# Patient Record
Sex: FEMALE | Race: WHITE | HISPANIC OR LATINO | Employment: PART TIME | URBAN - METROPOLITAN AREA
[De-identification: names, ages, dates, MRNs, and addresses within clinical notes are randomized per-mention and may not be internally consistent; named-entity substitution may affect disease eponyms.]

---

## 2017-09-13 ENCOUNTER — HOSPITAL ENCOUNTER (OUTPATIENT)
Dept: LAB | Facility: MEDICAL CENTER | Age: 37
End: 2017-09-13
Attending: NURSE PRACTITIONER
Payer: MEDICAID

## 2017-09-13 ENCOUNTER — HOSPITAL ENCOUNTER (OUTPATIENT)
Facility: MEDICAL CENTER | Age: 37
End: 2017-09-13
Attending: NURSE PRACTITIONER
Payer: MEDICAID

## 2017-09-13 ENCOUNTER — INITIAL PRENATAL (OUTPATIENT)
Dept: OBGYN | Facility: CLINIC | Age: 37
End: 2017-09-13
Payer: MEDICAID

## 2017-09-13 VITALS
WEIGHT: 205 LBS | SYSTOLIC BLOOD PRESSURE: 112 MMHG | BODY MASS INDEX: 35 KG/M2 | DIASTOLIC BLOOD PRESSURE: 70 MMHG | HEIGHT: 64 IN

## 2017-09-13 DIAGNOSIS — O09.512 AMA (ADVANCED MATERNAL AGE) PRIMIGRAVIDA 35+, SECOND TRIMESTER: ICD-10-CM

## 2017-09-13 DIAGNOSIS — Z34.02 ENCOUNTER FOR SUPERVISION OF NORMAL FIRST PREGNANCY IN SECOND TRIMESTER: ICD-10-CM

## 2017-09-13 PROBLEM — O09.519 AMA (ADVANCED MATERNAL AGE) PRIMIGRAVIDA 35+: Status: ACTIVE | Noted: 2017-09-13

## 2017-09-13 LAB
ABO GROUP BLD: NORMAL
APPEARANCE UR: NORMAL
BILIRUB UR STRIP-MCNC: NORMAL MG/DL
BLD GP AB SCN SERPL QL: NORMAL
COLOR UR AUTO: NORMAL
GLUCOSE UR STRIP.AUTO-MCNC: NORMAL MG/DL
KETONES UR STRIP.AUTO-MCNC: NORMAL MG/DL
LEUKOCYTE ESTERASE UR QL STRIP.AUTO: NORMAL
NITRITE UR QL STRIP.AUTO: NORMAL
PH UR STRIP.AUTO: 7.5 [PH] (ref 5–8)
PROT UR QL STRIP: NORMAL MG/DL
RBC UR QL AUTO: NORMAL
RH BLD: NORMAL
SP GR UR STRIP.AUTO: 1.01
UROBILINOGEN UR STRIP-MCNC: NORMAL MG/DL

## 2017-09-13 PROCEDURE — 59402 PR NEW OB HIGH RISK: CPT | Performed by: NURSE PRACTITIONER

## 2017-09-13 PROCEDURE — 86762 RUBELLA ANTIBODY: CPT

## 2017-09-13 PROCEDURE — 81001 URINALYSIS AUTO W/SCOPE: CPT

## 2017-09-13 PROCEDURE — 87491 CHLMYD TRACH DNA AMP PROBE: CPT

## 2017-09-13 PROCEDURE — 87340 HEPATITIS B SURFACE AG IA: CPT

## 2017-09-13 PROCEDURE — 88175 CYTOPATH C/V AUTO FLUID REDO: CPT

## 2017-09-13 PROCEDURE — 86900 BLOOD TYPING SEROLOGIC ABO: CPT

## 2017-09-13 PROCEDURE — 87591 N.GONORRHOEAE DNA AMP PROB: CPT

## 2017-09-13 PROCEDURE — 36415 COLL VENOUS BLD VENIPUNCTURE: CPT

## 2017-09-13 PROCEDURE — 81002 URINALYSIS NONAUTO W/O SCOPE: CPT | Performed by: NURSE PRACTITIONER

## 2017-09-13 PROCEDURE — 86901 BLOOD TYPING SEROLOGIC RH(D): CPT

## 2017-09-13 PROCEDURE — 86850 RBC ANTIBODY SCREEN: CPT

## 2017-09-13 PROCEDURE — 86780 TREPONEMA PALLIDUM: CPT

## 2017-09-13 PROCEDURE — 81511 FTL CGEN ABNOR FOUR ANAL: CPT

## 2017-09-13 PROCEDURE — 87389 HIV-1 AG W/HIV-1&-2 AB AG IA: CPT

## 2017-09-13 PROCEDURE — 85025 COMPLETE CBC W/AUTO DIFF WBC: CPT

## 2017-09-13 NOTE — PATIENT INSTRUCTIONS
Second Trimester of Pregnancy  The second trimester is from week 13 through week 28, month 4 through 6. This is often the time in pregnancy that you feel your best. Often times, morning sickness has lessened or quit. You may have more energy, and you may get hungry more often. Your unborn baby (fetus) is growing rapidly. At the end of the sixth month, he or she is about 9 inches long and weighs about 1½ pounds. You will likely feel the baby move (quickening) between 18 and 20 weeks of pregnancy.  HOME CARE   · Avoid all smoking, herbs, and alcohol. Avoid drugs not approved by your doctor.  · Do not use any tobacco products, including cigarettes, chewing tobacco, and electronic cigarettes. If you need help quitting, ask your doctor. You may get counseling or other support to help you quit.  · Only take medicine as told by your doctor. Some medicines are safe and some are not during pregnancy.  · Exercise only as told by your doctor. Stop exercising if you start having cramps.  · Eat regular, healthy meals.  · Wear a good support bra if your breasts are tender.  · Do not use hot tubs, steam rooms, or saunas.  · Wear your seat belt when driving.  · Avoid raw meat, uncooked cheese, and liter boxes and soil used by cats.  · Take your prenatal vitamins.  · Take 6679-7501 milligrams of calcium daily starting at the 20th week of pregnancy until you deliver your baby.  · Try taking medicine that helps you poop (stool softener) as needed, and if your doctor approves. Eat more fiber by eating fresh fruit, vegetables, and whole grains. Drink enough fluids to keep your pee (urine) clear or pale yellow.  · Take warm water baths (sitz baths) to soothe pain or discomfort caused by hemorrhoids. Use hemorrhoid cream if your doctor approves.  · If you have puffy, bulging veins (varicose veins), wear support hose. Raise (elevate) your feet for 15 minutes, 3-4 times a day. Limit salt in your diet.  · Avoid heavy lifting, wear low heals,  and sit up straight.  · Rest with your legs raised if you have leg cramps or low back pain.  · Visit your dentist if you have not gone during your pregnancy. Use a soft toothbrush to brush your teeth. Be gentle when you floss.  · You can have sex (intercourse) unless your doctor tells you not to.  · Go to your doctor visits.  GET HELP IF:   · You feel dizzy.  · You have mild cramps or pressure in your lower belly (abdomen).  · You have a nagging pain in your belly area.  · You continue to feel sick to your stomach (nauseous), throw up (vomit), or have watery poop (diarrhea).  · You have bad smelling fluid coming from your vagina.  · You have pain with peeing (urination).  GET HELP RIGHT AWAY IF:   · You have a fever.  · You are leaking fluid from your vagina.  · You have spotting or bleeding from your vagina.  · You have severe belly cramping or pain.  · You lose or gain weight rapidly.  · You have trouble catching your breath and have chest pain.  · You notice sudden or extreme puffiness (swelling) of your face, hands, ankles, feet, or legs.  · You have not felt the baby move in over an hour.  · You have severe headaches that do not go away with medicine.  · You have vision changes.     This information is not intended to replace advice given to you by your health care provider. Make sure you discuss any questions you have with your health care provider.     Document Released: 03/14/2011 Document Revised: 01/08/2016 Document Reviewed: 02/18/2014  [employer] Interactive Patient Education ©2016 [employer]

## 2017-09-13 NOTE — LETTER
September 13, 2017            Yousuf Rae is pregnant at 17w 2d with an estimated delivery date of 02/19/18 at this time.        Thank you,          LAUREL Oliva.

## 2017-09-13 NOTE — PROGRESS NOTES
Pt. Here for NOB visit today.  # [phone]  First prenatal care  Pt. States having white discharge w/ odor and itching during intercourse.   Pharmacy verified  Pt would like AFP.

## 2017-09-13 NOTE — PROGRESS NOTES
"Cc: New OB visit    HPI:  The patient is a 36 y.o.  17w2d based upon LMP  Patient's last menstrual period was 05/15/2017 (exact date). or @ultrasound@  She presents for her new obstetric visit.  She Reports  fetal movement,  denies  vaginal bleeding,  denies  leakage of fluid,  denies contractions.   She Denies nausea/vomiting, denies headache, denies dysuria, Reports vaginal discharge.  Father of baby involved:yes, family support: yes    states gets UTI's and or vaginal infections with sex but not ever been tested     OB History    Para Term  AB Living   1             SAB TAB Ectopic Molar Multiple Live Births                    # Outcome Date GA Lbr Tato/2nd Weight Sex Delivery Anes PTL Lv   1 Current                 No past medical history on file.  No past surgical history on file.  Social History     Social History   • Marital status:      Spouse name: N/A   • Number of children: N/A   • Years of education: N/A     Occupational History   • Not on file.     Social History Main Topics   • Smoking status: Never Smoker   • Smokeless tobacco: Never Used   • Alcohol use No   • Drug use: No   • Sexual activity: Yes      Comment: none     Other Topics Concern   • Not on file     Social History Narrative   • No narrative on file     History reviewed. No pertinent family history.  Allergies:   Allergies as of 2017   • (No Known Allergies)       PE:    Blood pressure 112/70, height 1.626 m (5' 4\"), weight 93 kg (205 lb), last menstrual period 05/15/2017.    SVE: closed thick floating      see prenatal physical  Wet prep done here prepared and read by me negative   LABS: Pending: PNP, Urine culture, GC/CT, Pap, AFP     A/P:  36 y.o.  17w2d based upon  Patient's last menstrual period was 05/15/2017 (exact date)..  She is here for her new obstetric visit.    1. Encounter for supervision of normal first pregnancy in second trimester  POCT Urinalysis    PREG CNTR PRENATAL PN    AFP TETRA    " THINPREP RFLX HPV ASCUS W/CTNG    Prenatal MV-Min-Fe Fum-FA-DHA (PRENATAL 1 PO)    REFERRAL TO PERINATOLOGY    US-OB 2ND 3RD TRI COMPLETE   2. AMA (advanced maternal age) primigravida 35+, second trimester  POCT Urinalysis    PREG CNTR PRENATAL PN    AFP TETRA    THINPREP RFLX HPV ASCUS W/CTNG    Prenatal MV-Min-Fe Fum-FA-DHA (PRENATAL 1 PO)    REFERRAL TO PERINATOLOGY    US-OB 2ND 3RD TRI COMPLETE       1. Ultrasound for dates and anatomy to be scheduled in 3 weeks.  2.  2nd trimester screening for Down Syndrome and neural tube defects offered. Patient accepts  3.  Do prenatal labs.  4.  NOB packet given with informational handouts.  5.  Increase water intake and encouraged healthy nutrition.  6.  Discussion of weight gain and proper exercise during pregnancy.  7.  continue prenatal vitamins.  8.  Follow-up in 4 weeks.   9. Discussion  individual care.   10. Genetic counseling at [facility] referral accepted and placed

## 2017-09-14 LAB
APPEARANCE UR: ABNORMAL
BACTERIA #/AREA URNS HPF: NEGATIVE /HPF
BASOPHILS # BLD AUTO: 0.3 % (ref 0–1.8)
BASOPHILS # BLD: 0.02 K/UL (ref 0–0.12)
BILIRUB UR QL STRIP.AUTO: NEGATIVE
C TRACH DNA GENITAL QL NAA+PROBE: NEGATIVE
CAOX CRY #/AREA URNS HPF: NORMAL /HPF
COLOR UR: YELLOW
CULTURE IF INDICATED INDCX: NO UA CULTURE
CYTOLOGY REG CYTOL: NORMAL
EOSINOPHIL # BLD AUTO: 0.04 K/UL (ref 0–0.51)
EOSINOPHIL NFR BLD: 0.5 % (ref 0–6.9)
EPI CELLS #/AREA URNS HPF: NORMAL /HPF
ERYTHROCYTE [DISTWIDTH] IN BLOOD BY AUTOMATED COUNT: 45.9 FL (ref 35.9–50)
GLUCOSE UR STRIP.AUTO-MCNC: NEGATIVE MG/DL
HBV SURFACE AG SER QL: NEGATIVE
HCT VFR BLD AUTO: 39.4 % (ref 37–47)
HGB BLD-MCNC: 13.7 G/DL (ref 12–16)
HIV 1+2 AB+HIV1 P24 AG SERPL QL IA: NON REACTIVE
HYALINE CASTS #/AREA URNS LPF: NORMAL /LPF
IMM GRANULOCYTES # BLD AUTO: 0.03 K/UL (ref 0–0.11)
IMM GRANULOCYTES NFR BLD AUTO: 0.4 % (ref 0–0.9)
KETONES UR STRIP.AUTO-MCNC: NEGATIVE MG/DL
LEUKOCYTE ESTERASE UR QL STRIP.AUTO: NEGATIVE
LYMPHOCYTES # BLD AUTO: 1.06 K/UL (ref 1–4.8)
LYMPHOCYTES NFR BLD: 14 % (ref 22–41)
MCH RBC QN AUTO: 34.3 PG (ref 27–33)
MCHC RBC AUTO-ENTMCNC: 34.8 G/DL (ref 33.6–35)
MCV RBC AUTO: 98.7 FL (ref 81.4–97.8)
MICRO URNS: ABNORMAL
MONOCYTES # BLD AUTO: 0.53 K/UL (ref 0–0.85)
MONOCYTES NFR BLD AUTO: 7 % (ref 0–13.4)
N GONORRHOEA DNA GENITAL QL NAA+PROBE: NEGATIVE
NEUTROPHILS # BLD AUTO: 5.91 K/UL (ref 2–7.15)
NEUTROPHILS NFR BLD: 77.8 % (ref 44–72)
NITRITE UR QL STRIP.AUTO: NEGATIVE
NRBC # BLD AUTO: 0 K/UL
NRBC BLD AUTO-RTO: 0 /100 WBC
PH UR STRIP.AUTO: 6 [PH]
PLATELET # BLD AUTO: 230 K/UL (ref 164–446)
PMV BLD AUTO: 11 FL (ref 9–12.9)
PROT UR QL STRIP: NEGATIVE MG/DL
RBC # BLD AUTO: 3.99 M/UL (ref 4.2–5.4)
RBC # URNS HPF: NORMAL /HPF
RBC UR QL AUTO: NEGATIVE
RUBV AB SER QL: 184.7 IU/ML
SP GR UR STRIP.AUTO: 1.02
SPECIMEN SOURCE: NORMAL
TREPONEMA PALLIDUM IGG+IGM AB [PRESENCE] IN SERUM OR PLASMA BY IMMUNOASSAY: NON REACTIVE
UROBILINOGEN UR STRIP.AUTO-MCNC: 0.2 MG/DL
WBC # BLD AUTO: 7.6 K/UL (ref 4.8–10.8)
WBC #/AREA URNS HPF: NORMAL /HPF

## 2017-09-14 NOTE — PROGRESS NOTES
Referral faxed to [facility] on 9/14/17  They will contact patient to schedule appt.  Please check with patient if appt was made/ document. Thank you

## 2017-09-16 LAB
# FETUSES US: NORMAL
AFP MOM SERPL: 0.94
AFP SERPL-MCNC: 29 NG/ML
AGE - REPORTED: 36.3 YR
GA METHOD: NORMAL
GA: 17.29 WEEKS
HCG MOM SERPL: 0.88
HCG SERPL-ACNC: NORMAL IU/L
IDDM PATIENT QL: NO
INHIBIN A MOM SERPL: 1.65
INHIBIN A SERPL-MCNC: 231 PG/ML
INTEGRATED SCN PATIENT-IMP: NORMAL
PATHOLOGY STUDY: NORMAL
U ESTRIOL MOM SERPL: 1.31
U ESTRIOL SERPL-MCNC: 1.44 NG/ML

## 2017-10-05 ENCOUNTER — APPOINTMENT (OUTPATIENT)
Dept: RADIOLOGY | Facility: IMAGING CENTER | Age: 37
End: 2017-10-05
Attending: NURSE PRACTITIONER
Payer: MEDICAID

## 2017-10-05 DIAGNOSIS — O09.512 AMA (ADVANCED MATERNAL AGE) PRIMIGRAVIDA 35+, SECOND TRIMESTER: ICD-10-CM

## 2017-10-05 DIAGNOSIS — Z34.02 ENCOUNTER FOR SUPERVISION OF NORMAL FIRST PREGNANCY IN SECOND TRIMESTER: ICD-10-CM

## 2017-10-05 PROCEDURE — 76805 OB US >/= 14 WKS SNGL FETUS: CPT | Performed by: OBSTETRICS & GYNECOLOGY

## 2017-10-06 ENCOUNTER — DATING (OUTPATIENT)
Dept: OBGYN | Facility: CLINIC | Age: 37
End: 2017-10-06

## 2017-10-10 ENCOUNTER — ROUTINE PRENATAL (OUTPATIENT)
Dept: OBGYN | Facility: CLINIC | Age: 37
End: 2017-10-10
Payer: MEDICAID

## 2017-10-10 VITALS — SYSTOLIC BLOOD PRESSURE: 120 MMHG | BODY MASS INDEX: 36.22 KG/M2 | WEIGHT: 211 LBS | DIASTOLIC BLOOD PRESSURE: 70 MMHG

## 2017-10-10 DIAGNOSIS — R32 URINARY INCONTINENCE, UNSPECIFIED TYPE: ICD-10-CM

## 2017-10-10 LAB
APPEARANCE UR: NORMAL
BILIRUB UR STRIP-MCNC: NORMAL MG/DL
COLOR UR AUTO: NORMAL
GLUCOSE UR STRIP.AUTO-MCNC: NEGATIVE MG/DL
KETONES UR STRIP.AUTO-MCNC: NEGATIVE MG/DL
LEUKOCYTE ESTERASE UR QL STRIP.AUTO: NEGATIVE
NITRITE UR QL STRIP.AUTO: NEGATIVE
PH UR STRIP.AUTO: 8 [PH] (ref 5–8)
PROT UR QL STRIP: NORMAL MG/DL
RBC UR QL AUTO: NEGATIVE
SP GR UR STRIP.AUTO: 1.01
UROBILINOGEN UR STRIP-MCNC: NORMAL MG/DL

## 2017-10-10 PROCEDURE — 90040 PR PRENATAL FOLLOW UP: CPT | Performed by: NURSE PRACTITIONER

## 2017-10-10 PROCEDURE — 81002 URINALYSIS NONAUTO W/O SCOPE: CPT | Performed by: NURSE PRACTITIONER

## 2017-10-10 NOTE — PROGRESS NOTES
S: Yousuf Rae at [id number] for OB  follow up visit.  Patient  Has no complaints of today, feels like she is leaking urine when she has to go and with exercise  Fetal movement is +  Denies any loss of fluid, vaginal bleeding or contractions.    O: VSS  Urine dip normal no findings   See above values  Cervical exam NE     A: Primip 21w1d  AMA    P:   2nd   Trimester Guidance and comfort measures, diet and exercise review.  Labs:   RTC in 4 weeks   [facility] information given to call for apt for GC  Kegel exercises

## 2017-10-10 NOTE — PROGRESS NOTES
OB f/u. + fetal movement.  No VB, LOF or UC's.  Wt: 211lb      BP: 120/70  Good phone # [phone]  Preferred pharmacy confirmed.  Complete US done 10/5/17  [facility]. No appt yet. Information for [facility] given to patient.  C/o leaking urine when walking. Also c/o fingers getting numb and tingling  Flu vaccine declined.

## 2017-11-07 ENCOUNTER — ROUTINE PRENATAL (OUTPATIENT)
Dept: OBGYN | Facility: CLINIC | Age: 37
End: 2017-11-07
Payer: MEDICAID

## 2017-11-07 VITALS — WEIGHT: 217 LBS | SYSTOLIC BLOOD PRESSURE: 118 MMHG | BODY MASS INDEX: 37.25 KG/M2 | DIASTOLIC BLOOD PRESSURE: 70 MMHG

## 2017-11-07 DIAGNOSIS — O09.512 ELDERLY PRIMIGRAVIDA IN SECOND TRIMESTER: ICD-10-CM

## 2017-11-07 DIAGNOSIS — Z34.02 ENCOUNTER FOR SUPERVISION OF NORMAL FIRST PREGNANCY IN SECOND TRIMESTER: ICD-10-CM

## 2017-11-07 PROCEDURE — 90040 PR PRENATAL FOLLOW UP: CPT | Performed by: NURSE PRACTITIONER

## 2017-11-07 PROCEDURE — 90471 IMMUNIZATION ADMIN: CPT | Performed by: NURSE PRACTITIONER

## 2017-11-07 PROCEDURE — 90686 IIV4 VACC NO PRSV 0.5 ML IM: CPT | Performed by: NURSE PRACTITIONER

## 2017-11-07 NOTE — PROGRESS NOTES
Seelauro JOHNSON on 11/9/17 for RAFAELAA     S: Yousuf Rae at [id number] for OB  follow up visit.  Patient has no complaints of today   Fetal movement is +  Denies any loss of fluid, vaginal bleeding or contractions.    O: VSS  Urine dip NE  See above values  Cervical exam NE    A: multip 25w1d      P:   2nd  Trimester Guidance and comfort measures, diet and exercise review.  Labs: 1 hour GTT H&H and TPA  Flu vaccine today  RTC in 4 weeks   Watch weight gain

## 2017-11-07 NOTE — PROGRESS NOTES
OB f/u. + fetal movement.  No VB, LOF or UC's.  Wt:217lb       BP:   Good phone # [phone]  Preferred pharmacy confirmed.  3rd trimester lab orders pended and instructions given to patient  Flu vaccine today  ELIZABETH 11/9/17

## 2017-11-14 ENCOUNTER — DATING (OUTPATIENT)
Dept: OBGYN | Facility: CLINIC | Age: 37
End: 2017-11-14

## 2017-11-14 ENCOUNTER — HOSPITAL ENCOUNTER (OUTPATIENT)
Dept: LAB | Facility: MEDICAL CENTER | Age: 37
End: 2017-11-14
Attending: NURSE PRACTITIONER
Payer: MEDICAID

## 2017-11-14 DIAGNOSIS — O09.512 ELDERLY PRIMIGRAVIDA IN SECOND TRIMESTER: ICD-10-CM

## 2017-11-14 DIAGNOSIS — Z34.02 ENCOUNTER FOR SUPERVISION OF NORMAL FIRST PREGNANCY IN SECOND TRIMESTER: ICD-10-CM

## 2017-11-14 LAB
GLUCOSE 1H P 50 G GLC PO SERPL-MCNC: 123 MG/DL (ref 70–139)
HCT VFR BLD AUTO: 37.9 % (ref 37–47)
HGB BLD-MCNC: 12.9 G/DL (ref 12–16)
TREPONEMA PALLIDUM IGG+IGM AB [PRESENCE] IN SERUM OR PLASMA BY IMMUNOASSAY: NON REACTIVE

## 2017-11-14 PROCEDURE — 85018 HEMOGLOBIN: CPT

## 2017-11-14 PROCEDURE — 82950 GLUCOSE TEST: CPT

## 2017-11-14 PROCEDURE — 36415 COLL VENOUS BLD VENIPUNCTURE: CPT

## 2017-11-14 PROCEDURE — 85014 HEMATOCRIT: CPT

## 2017-11-14 PROCEDURE — 86780 TREPONEMA PALLIDUM: CPT

## 2017-12-05 ENCOUNTER — ROUTINE PRENATAL (OUTPATIENT)
Dept: OBGYN | Facility: CLINIC | Age: 37
End: 2017-12-05
Payer: MEDICAID

## 2017-12-05 VITALS — DIASTOLIC BLOOD PRESSURE: 82 MMHG | BODY MASS INDEX: 37.93 KG/M2 | SYSTOLIC BLOOD PRESSURE: 110 MMHG | WEIGHT: 221 LBS

## 2017-12-05 DIAGNOSIS — Z34.02 ENCOUNTER FOR SUPERVISION OF NORMAL FIRST PREGNANCY IN SECOND TRIMESTER: ICD-10-CM

## 2017-12-05 DIAGNOSIS — O09.513 ELDERLY PRIMIGRAVIDA IN THIRD TRIMESTER: ICD-10-CM

## 2017-12-05 PROCEDURE — 90040 PR PRENATAL FOLLOW UP: CPT | Performed by: NURSE PRACTITIONER

## 2017-12-05 NOTE — PROGRESS NOTES
Pt here today for OB follow up  Pt states vaginal discharge better  Reports +FM  Good # [phone]  Pharmacy Confirmed.  [facility] appt 11/9, no further w/u  Patient declines Tdap  Joaquim sheet given and instructions

## 2017-12-05 NOTE — PROGRESS NOTES
S: Yousuf Miller at [id number] for OB  follow up visit.  Patient  Has no complaints of  Today   Fetal movement is +  Denies any loss of fluid, vaginal bleeding or contractions.    O: VSS  Urine dip  NE   See above values  Cervical exam NE    A:  primip 29w1d  AMA    P:   3rd  Trimester Guidance and comfort measures, diet and exercise review.  Labs: none due all reviewed   Fetal Kick Count  Given and explained   RTC in 2 weeks

## 2017-12-05 NOTE — LETTER
"Count Your Baby's Movements  Another step to a healthy delivery  Yousuf MoranCecile             Dept: [phone]    How Many Weeks Pregnant? 29w1d    Date to Begin Countin17              How to use this chart    One way for your physician to keep track of your baby's health is by knowing how often the baby moves (or \"kicks\") in your womb.  You can help your physician to do this by using this chart every day.    Every day, you should see how many hours it takes for your baby to move 10 times.  Start in the morning, as soon as you get up.    · First, write down the time your baby moves until you get to 10.  · Check off one box every time your baby moves until you get to 10.  · Write down the time you finished counting in the last column.  · Total how long it took to count up all 10 movements.  · Finally, fill in the box that shows how long this took.  After counting 10 movements, you no longer have to count any more that day.  The next morning, just start counting again as soon as you get up.    What should you call a \"movement\"?  It is hard to say, because it will feel different from one mother to another and from one pregnancy to the next.  The important thing is that you count the movements the same way throughout your pregnancy.  If you have more questions, you should ask your physician.    Count carefully every day!  SAMPLE:  Week 28    How many hours did it take to feel 10 movements?       Start  Time     1     2     3     4     5     6     7     8     9     10   Finish Time   Mon 8:20 ·  ·  ·  ·  ·  ·  ·  ·  ·  ·  11:40                  Sat               Sun                 IMPORTANT: You should contact your physician if it takes more than two hours for you to feel 10 movements.  Each morning, write down the time and start to count the movements of your baby.  Keep track by checking off one box every time you feel one movement.  When you have " "felt 10 \"kicks\", write down the time you finished counting in the last column.  Then fill in the   box (over the check tashi) for the number of hours it took.  Be sure to read the complete instructions on the previous page.            "

## 2017-12-20 ENCOUNTER — ROUTINE PRENATAL (OUTPATIENT)
Dept: OBGYN | Facility: CLINIC | Age: 37
End: 2017-12-20
Payer: MEDICAID

## 2017-12-20 VITALS — SYSTOLIC BLOOD PRESSURE: 114 MMHG | DIASTOLIC BLOOD PRESSURE: 70 MMHG | BODY MASS INDEX: 38.11 KG/M2 | WEIGHT: 222 LBS

## 2017-12-20 DIAGNOSIS — Z34.02 ENCOUNTER FOR SUPERVISION OF NORMAL FIRST PREGNANCY IN SECOND TRIMESTER: Primary | ICD-10-CM

## 2017-12-20 PROCEDURE — 90715 TDAP VACCINE 7 YRS/> IM: CPT | Performed by: NURSE PRACTITIONER

## 2017-12-20 PROCEDURE — 90040 PR PRENATAL FOLLOW UP: CPT | Performed by: NURSE PRACTITIONER

## 2017-12-20 PROCEDURE — 90471 IMMUNIZATION ADMIN: CPT | Performed by: NURSE PRACTITIONER

## 2017-12-20 NOTE — PATIENT INSTRUCTIONS
P:  1.  GBS @ 35 wks.          2.  Continue FKCs.          3.  Questions answered.          4.  Encouraged pt to tour L&D.          5.  Encourage adequate water intake.        6.  F/u 2 wks.        7.  Tdap given.

## 2017-12-20 NOTE — PROGRESS NOTES
S:  Pt is  at 31w2d for routine OB follow up.  No c/o today.  Reports good FM.  Denies VB, LOF, RUCs or vaginal DC.    O:  Please see above vitals.        FHTs: 145        Fundal ht: 32 cm.    A:  IUP at 31w2d  Patient Active Problem List    Diagnosis Date Noted   • Encounter for supervision of normal first pregnancy in second trimester 2017   • AMA (advanced maternal age) primigravida 35+ 2017        P:  1.  GBS @ 35 wks.          2.  Continue FKCs.          3.  Questions answered.          4.  Encouraged pt to tour L&D.          5.  Encourage adequate water intake.        6.  F/u 2 wks.        7.  Tdap given.     I have placed the below orders and discussed them with an approved delegating provider. The MA is performing the below orders under the direction of  Dr. Monroy.

## 2017-12-20 NOTE — PROGRESS NOTES
Pt here today for OB follow up  Reports +FM  WT: 222 lb  BP: 114/70  Pt states no complaints today  Desires TDap vaccine   BTL discussed. Pt declines.  Good # [phone]    Tdap vaccine given. Right Deltoid. VIS given and screening check list reviewed with pt.

## 2018-01-04 ENCOUNTER — ROUTINE PRENATAL (OUTPATIENT)
Dept: OBGYN | Facility: CLINIC | Age: 38
End: 2018-01-04
Payer: MEDICAID

## 2018-01-04 VITALS — SYSTOLIC BLOOD PRESSURE: 112 MMHG | DIASTOLIC BLOOD PRESSURE: 80 MMHG | BODY MASS INDEX: 38.79 KG/M2 | WEIGHT: 226 LBS

## 2018-01-04 DIAGNOSIS — O09.513 ELDERLY PRIMIGRAVIDA IN THIRD TRIMESTER: ICD-10-CM

## 2018-01-04 DIAGNOSIS — Z34.02 ENCOUNTER FOR SUPERVISION OF NORMAL FIRST PREGNANCY IN SECOND TRIMESTER: ICD-10-CM

## 2018-01-04 PROCEDURE — 90040 PR PRENATAL FOLLOW UP: CPT | Performed by: NURSE PRACTITIONER

## 2018-01-04 NOTE — PROGRESS NOTES
S: Yousuf Miller at [id number] for OB  follow up visit.  Patient  Has no complaints of  Today   Fetal movement is  +  Denies any loss of fluid, vaginal bleeding or contractions.    O: VSS  Urine dip  NE   See above values  Cervical exam NE     A: primip 33w3d      P:   3rd   Trimester Guidance and comfort measures, diet and exercise review.  Labs:  None due   Fetal Kick Count  Continue every day   RTC in 2 weeks

## 2018-01-16 ENCOUNTER — ROUTINE PRENATAL (OUTPATIENT)
Dept: OBGYN | Facility: CLINIC | Age: 38
End: 2018-01-16
Payer: MEDICAID

## 2018-01-16 ENCOUNTER — HOSPITAL ENCOUNTER (OUTPATIENT)
Facility: MEDICAL CENTER | Age: 38
End: 2018-01-16
Attending: NURSE PRACTITIONER
Payer: MEDICAID

## 2018-01-16 VITALS — SYSTOLIC BLOOD PRESSURE: 120 MMHG | DIASTOLIC BLOOD PRESSURE: 70 MMHG | BODY MASS INDEX: 39.31 KG/M2 | WEIGHT: 229 LBS

## 2018-01-16 DIAGNOSIS — O09.513 ELDERLY PRIMIGRAVIDA IN THIRD TRIMESTER: ICD-10-CM

## 2018-01-16 DIAGNOSIS — Z34.02 ENCOUNTER FOR SUPERVISION OF NORMAL FIRST PREGNANCY IN SECOND TRIMESTER: ICD-10-CM

## 2018-01-16 PROCEDURE — 90040 PR PRENATAL FOLLOW UP: CPT | Performed by: NURSE PRACTITIONER

## 2018-01-16 PROCEDURE — 87653 STREP B DNA AMP PROBE: CPT

## 2018-01-16 NOTE — PROGRESS NOTES
Pt here today for OB follow up  GBS to be done today  Reports +FM  WT: 229 lb  BP: 120/70  Pt states she noticed her pants wet on Saturday night states it happened once. Pt states she did not go to the hospital to be checked. States no leakage or any other concerns today.   Kieran # [phone]

## 2018-01-16 NOTE — PROGRESS NOTES
S: Yousuf Miller at [id number] for OB  follow up visit.  Patient  Has complaints of  A leakage of fluid one time on Saturday evening, no continued leaking, and was not wet after the spot of what ever came out.   We discussed this could be D/C or urine   Fetal movement is +  Denies any loss of fluid, vaginal bleeding or contractions.    O: VSS  Urine dip NE  See above values  Cervical exam Closed/50/-2 posterior     A: primip  35w1d      P:   3rd   Trimester Guidance and comfort measures, diet and exercise review.  Labs:  gbs obtained   Fetal Kick Count  continue  RTC in  1 week   Labor s/s reviewed with client

## 2018-01-17 DIAGNOSIS — Z34.02 ENCOUNTER FOR SUPERVISION OF NORMAL FIRST PREGNANCY IN SECOND TRIMESTER: ICD-10-CM

## 2018-01-17 DIAGNOSIS — O09.513 ELDERLY PRIMIGRAVIDA IN THIRD TRIMESTER: ICD-10-CM

## 2018-01-18 LAB — GP B STREP DNA SPEC QL NAA+PROBE: NEGATIVE

## 2018-01-23 ENCOUNTER — ROUTINE PRENATAL (OUTPATIENT)
Dept: OBGYN | Facility: CLINIC | Age: 38
End: 2018-01-23
Payer: MEDICAID

## 2018-01-23 VITALS — SYSTOLIC BLOOD PRESSURE: 108 MMHG | BODY MASS INDEX: 39.99 KG/M2 | WEIGHT: 233 LBS | DIASTOLIC BLOOD PRESSURE: 70 MMHG

## 2018-01-23 DIAGNOSIS — Z34.02 ENCOUNTER FOR SUPERVISION OF NORMAL FIRST PREGNANCY IN SECOND TRIMESTER: ICD-10-CM

## 2018-01-23 DIAGNOSIS — O09.513 ELDERLY PRIMIGRAVIDA IN THIRD TRIMESTER: ICD-10-CM

## 2018-01-23 PROCEDURE — 90040 PR PRENATAL FOLLOW UP: CPT | Performed by: NURSE PRACTITIONER

## 2018-01-23 NOTE — PROGRESS NOTES
S: Yousuf Miller at [id number] for OB  follow up visit.  Patient  Has no complaints of today   Fetal movement is  +  Denies any loss of fluid, vaginal bleeding or contractions.    O: VSS  Urine dip  NE  See above values  Cervical exam NE    A: primip 36w1d  AMA  P:   3rd  Trimester Guidance and comfort measures, diet and exercise review.  Labs:  None due GBS negative  Fetal Kick Count  Continue   RTC in  1 week  Labor s/s given

## 2018-01-23 NOTE — PROGRESS NOTES
Pt here today for OB follow up  Pt states no complaints  Reports +  Good # [phone]  Pharmacy Confirmed.

## 2018-01-30 ENCOUNTER — ROUTINE PRENATAL (OUTPATIENT)
Dept: OBGYN | Facility: CLINIC | Age: 38
End: 2018-01-30
Payer: MEDICAID

## 2018-01-30 VITALS — BODY MASS INDEX: 40.17 KG/M2 | WEIGHT: 234 LBS | DIASTOLIC BLOOD PRESSURE: 70 MMHG | SYSTOLIC BLOOD PRESSURE: 116 MMHG

## 2018-01-30 DIAGNOSIS — Z34.80 SUPERVISION OF OTHER NORMAL PREGNANCY, ANTEPARTUM: ICD-10-CM

## 2018-01-30 PROCEDURE — 90040 PR PRENATAL FOLLOW UP: CPT | Performed by: NURSE PRACTITIONER

## 2018-01-30 NOTE — PROGRESS NOTES
SUBJECTIVE:  Pt is a 37 y.o.   at 37w1d  gestation. Presents today for follow-up prenatal care. Reports no issues at this time.  Reports good  fetal movement. Denies cramping/contractions, bleeding or leaking of fluid. Denies dysuria, headaches, N/V, or other issues at this time. Generally feels well today.     OBJECTIVE:  - See prenatal vitals flow  -   Vitals:    18 1407   BP: 116/70   Weight: 106.1 kg (234 lb)      Labs - normal prenatal panel, normal AFP, normal glucose.    US normal fetal survey         ASSESSMENT:   - IUP at 37w1d    - S=D   -   Patient Active Problem List    Diagnosis Date Noted   • Encounter for supervision of normal first pregnancy in second trimester 2017   • AMA (advanced maternal age) primigravida 35+ 2017         PLAN:  - S/sx pregnancy and labor warning signs vs general discomforts discussed  - Fetal movements and kick counts reviewed   - Adequate hydration reinforced  - Nutrition/exercise/vitamin education; continued PNV  -Plans condoms.

## 2018-01-30 NOTE — PROGRESS NOTES
Ob f/u. + fetal movement good  No VB, LOF or contractions   C/O no complaints today   Phone number # [phone]  Pharmacy verified with patient  WT=  234 lbs            JR=691/70

## 2018-02-06 ENCOUNTER — ROUTINE PRENATAL (OUTPATIENT)
Dept: OBGYN | Facility: CLINIC | Age: 38
End: 2018-02-06
Payer: MEDICAID

## 2018-02-06 VITALS — SYSTOLIC BLOOD PRESSURE: 108 MMHG | WEIGHT: 237 LBS | BODY MASS INDEX: 40.68 KG/M2 | DIASTOLIC BLOOD PRESSURE: 78 MMHG

## 2018-02-06 DIAGNOSIS — Z34.02 ENCOUNTER FOR SUPERVISION OF NORMAL FIRST PREGNANCY IN SECOND TRIMESTER: ICD-10-CM

## 2018-02-06 DIAGNOSIS — O09.513 ELDERLY PRIMIGRAVIDA IN THIRD TRIMESTER: ICD-10-CM

## 2018-02-06 LAB
APPEARANCE UR: NORMAL
BILIRUB UR STRIP-MCNC: NORMAL MG/DL
COLOR UR AUTO: NORMAL
GLUCOSE UR STRIP.AUTO-MCNC: NEGATIVE MG/DL
KETONES UR STRIP.AUTO-MCNC: NEGATIVE MG/DL
LEUKOCYTE ESTERASE UR QL STRIP.AUTO: NEGATIVE
NITRITE UR QL STRIP.AUTO: NEGATIVE
PH UR STRIP.AUTO: 5 [PH] (ref 5–8)
PROT UR QL STRIP: NORMAL MG/DL
RBC UR QL AUTO: NORMAL
SP GR UR STRIP.AUTO: 1.02
UROBILINOGEN UR STRIP-MCNC: NORMAL MG/DL

## 2018-02-06 PROCEDURE — 81002 URINALYSIS NONAUTO W/O SCOPE: CPT | Performed by: NURSE PRACTITIONER

## 2018-02-06 PROCEDURE — 90040 PR PRENATAL FOLLOW UP: CPT | Performed by: NURSE PRACTITIONER

## 2018-02-06 NOTE — PROGRESS NOTES
Pt here today for OB follow up  Pt states some pressure  Reports +FM  Good # [phone]  Pharmacy Confirmed.

## 2018-02-06 NOTE — PROGRESS NOTES
S: Yousuf Miller at [id number] for OB  follow up visit.  Patient  Has no complaints of today pressure only  Fetal movement is +  Denies any loss of fluid, vaginal bleeding or contractions.    O: VSS  Urine dip trace protein and SPG 1.020   See above values  Cervical exam FT/80/-1 posterior soft     A: primip 38w1d  AMA      P:   3rd  Trimester Guidance and comfort measures, diet and exercise review.  Labs: none due   Fetal Kick Count continue   RTC in 1 week   IOL ordered  Labor s/s reviewed again today   Encouraged increase water

## 2018-02-12 ENCOUNTER — HOSPITAL ENCOUNTER (OUTPATIENT)
Facility: MEDICAL CENTER | Age: 38
End: 2018-02-12
Attending: OBSTETRICS & GYNECOLOGY | Admitting: OBSTETRICS & GYNECOLOGY
Payer: MEDICAID

## 2018-02-12 VITALS
SYSTOLIC BLOOD PRESSURE: 134 MMHG | WEIGHT: 232 LBS | HEART RATE: 70 BPM | DIASTOLIC BLOOD PRESSURE: 88 MMHG | BODY MASS INDEX: 39.61 KG/M2 | HEIGHT: 64 IN | TEMPERATURE: 98.9 F

## 2018-02-12 PROCEDURE — 59025 FETAL NON-STRESS TEST: CPT | Performed by: NURSE PRACTITIONER

## 2018-02-13 ENCOUNTER — ROUTINE PRENATAL (OUTPATIENT)
Dept: OBGYN | Facility: CLINIC | Age: 38
End: 2018-02-13
Payer: MEDICAID

## 2018-02-13 VITALS — DIASTOLIC BLOOD PRESSURE: 74 MMHG | WEIGHT: 236 LBS | BODY MASS INDEX: 40.51 KG/M2 | SYSTOLIC BLOOD PRESSURE: 122 MMHG

## 2018-02-13 DIAGNOSIS — Z34.02 ENCOUNTER FOR SUPERVISION OF NORMAL FIRST PREGNANCY IN SECOND TRIMESTER: Primary | ICD-10-CM

## 2018-02-13 PROCEDURE — 90040 PR PRENATAL FOLLOW UP: CPT | Performed by: PHYSICIAN ASSISTANT

## 2018-02-13 NOTE — PROGRESS NOTES
OB f/u. + fetal movement.  No VB, LOF or UC's.  C/o lower back pain, cramping  Wt: 236lb      BP:122/74  Good phone # [phone]  Preferred pharmacy confirmed.  GBS negative  Patient is scheduled for GEL on 02/23/18 at 8pm and IOL on 02/24/18 at 8am

## 2018-02-13 NOTE — PROGRESS NOTES
EDC  39w0d. Pt presents to L&D with complaints of bleeding and lower pelvic pain.     1934 TOCO and EFM applied, VSS. Pt reports +FM, denies LOF but has had some spotting. Pt states she feels some lower pelvic pressure that comes and goes, but is equal on both sides. SVE closed/50/ballot. Will update MAT Costello on pt status.      MAT Costello updated, once reactive NST obtained, okay to discharge home.      Reactive tracing obtained     RN at bedside, labor precautions given, all questions answered.      Pt discharged home in stable condition

## 2018-02-13 NOTE — PROGRESS NOTES
Pt has no complaints with cramping, UCs, Vb, LOF. +FM. IOL scheduled for 2/23 for OP gel and 2/24 for IOL. Pt was seen last night in L&D for vag bleeding, but sent home. Cervix: 1/80/-1, mid, very soft, vtx. Labor precautions stressed, and daily FKC and walks recommended. F/u in 1 wk, then IOL 2/23-2/24 or sooner prn.

## 2018-02-21 ENCOUNTER — ROUTINE PRENATAL (OUTPATIENT)
Dept: OBGYN | Facility: CLINIC | Age: 38
End: 2018-02-21
Payer: MEDICAID

## 2018-02-21 VITALS — WEIGHT: 239 LBS | BODY MASS INDEX: 41.02 KG/M2 | DIASTOLIC BLOOD PRESSURE: 66 MMHG | SYSTOLIC BLOOD PRESSURE: 120 MMHG

## 2018-02-21 DIAGNOSIS — O09.513 ELDERLY PRIMIGRAVIDA IN THIRD TRIMESTER: ICD-10-CM

## 2018-02-21 DIAGNOSIS — Z34.02 ENCOUNTER FOR SUPERVISION OF NORMAL FIRST PREGNANCY IN SECOND TRIMESTER: ICD-10-CM

## 2018-02-21 PROCEDURE — 90040 PR PRENATAL FOLLOW UP: CPT | Performed by: NURSE PRACTITIONER

## 2018-02-21 NOTE — PROGRESS NOTES
S: Yousuf Miller at [id number] for OB  follow up visit.  Patient  Has no complaints of  Today   Scant old brown mucous coming out on occasion   Fetal movement is +  Denies any loss of fluid, vaginal bleeding or contractions.    O: VSS  Urine dip NE  See above values  Cervical exam 1/60/-1       A: primip 40w2d      P:   3rd  Trimester Guidance and comfort measures, diet and exercise review.  Labs:  None due  IOL instructions are given   Fetal Kick Count continue   RTC in  IOL 2/23/18  Labor s/s reviewed

## 2018-02-21 NOTE — PROGRESS NOTES
Pt. Here for OB/FU today. Reports Good FM.   Good # [phone]  Pt states having some leaking of fluid.  Pharmacy verified.   Patient is scheduled for GEL on 02/23/18 at 8pm and IOL on 02/24/18 at 8 am, pt notified and given instructions today.

## 2018-02-23 ENCOUNTER — HOSPITAL ENCOUNTER (INPATIENT)
Facility: MEDICAL CENTER | Age: 38
LOS: 3 days | End: 2018-02-26
Attending: OBSTETRICS & GYNECOLOGY | Admitting: OBSTETRICS & GYNECOLOGY
Payer: MEDICAID

## 2018-02-23 LAB
APPEARANCE UR: ABNORMAL
AST SERPL-CCNC: 15 U/L (ref 12–45)
BUN SERPL-MCNC: 12 MG/DL (ref 8–22)
COLOR UR AUTO: ABNORMAL
CREAT SERPL-MCNC: 0.53 MG/DL (ref 0.5–1.4)
ERYTHROCYTE [DISTWIDTH] IN BLOOD BY AUTOMATED COUNT: 44.8 FL (ref 35.9–50)
GLUCOSE UR QL STRIP.AUTO: NEGATIVE MG/DL
HCT VFR BLD AUTO: 39.4 % (ref 37–47)
HGB BLD-MCNC: 13.9 G/DL (ref 12–16)
KETONES UR QL STRIP.AUTO: ABNORMAL MG/DL
LEUKOCYTE ESTERASE UR QL STRIP.AUTO: NEGATIVE
MCH RBC QN AUTO: 35.1 PG (ref 27–33)
MCHC RBC AUTO-ENTMCNC: 35.3 G/DL (ref 33.6–35)
MCV RBC AUTO: 99.5 FL (ref 81.4–97.8)
NITRITE UR QL STRIP.AUTO: NEGATIVE
PH UR STRIP.AUTO: 5.5 [PH]
PLATELET # BLD AUTO: 158 K/UL (ref 164–446)
PMV BLD AUTO: 12.1 FL (ref 9–12.9)
PROT UR QL STRIP: 30 MG/DL
RBC # BLD AUTO: 3.96 M/UL (ref 4.2–5.4)
RBC UR QL AUTO: ABNORMAL
SP GR UR: >=1.03
URATE SERPL-MCNC: 5 MG/DL (ref 1.9–8.2)
WBC # BLD AUTO: 9 K/UL (ref 4.8–10.8)

## 2018-02-23 PROCEDURE — 4A1HX4Z MONITORING OF PRODUCTS OF CONCEPTION, CARDIAC ELECTRICAL ACTIVITY, EXTERNAL APPROACH: ICD-10-PCS | Performed by: OBSTETRICS & GYNECOLOGY

## 2018-02-23 PROCEDURE — 81002 URINALYSIS NONAUTO W/O SCOPE: CPT

## 2018-02-23 PROCEDURE — 700101 HCHG RX REV CODE 250: Performed by: NURSE PRACTITIONER

## 2018-02-23 PROCEDURE — 3E0P7VZ INTRODUCTION OF HORMONE INTO FEMALE REPRODUCTIVE, VIA NATURAL OR ARTIFICIAL OPENING: ICD-10-PCS | Performed by: OBSTETRICS & GYNECOLOGY

## 2018-02-23 PROCEDURE — 82565 ASSAY OF CREATININE: CPT

## 2018-02-23 PROCEDURE — 700105 HCHG RX REV CODE 258

## 2018-02-23 PROCEDURE — 84450 TRANSFERASE (AST) (SGOT): CPT

## 2018-02-23 PROCEDURE — 84550 ASSAY OF BLOOD/URIC ACID: CPT

## 2018-02-23 PROCEDURE — 85027 COMPLETE CBC AUTOMATED: CPT

## 2018-02-23 PROCEDURE — 770002 HCHG ROOM/CARE - OB PRIVATE (112)

## 2018-02-23 PROCEDURE — 84520 ASSAY OF UREA NITROGEN: CPT

## 2018-02-23 RX ORDER — TERBUTALINE SULFATE 1 MG/ML
0.25 INJECTION, SOLUTION SUBCUTANEOUS PRN
Status: DISCONTINUED | OUTPATIENT
Start: 2018-02-23 | End: 2018-02-24 | Stop reason: HOSPADM

## 2018-02-23 RX ORDER — HYDROXYZINE 50 MG/1
50 TABLET, FILM COATED ORAL EVERY 6 HOURS PRN
Status: DISCONTINUED | OUTPATIENT
Start: 2018-02-23 | End: 2018-02-24 | Stop reason: HOSPADM

## 2018-02-23 RX ORDER — SODIUM CHLORIDE, SODIUM LACTATE, POTASSIUM CHLORIDE, CALCIUM CHLORIDE 600; 310; 30; 20 MG/100ML; MG/100ML; MG/100ML; MG/100ML
INJECTION, SOLUTION INTRAVENOUS CONTINUOUS
Status: DISPENSED | OUTPATIENT
Start: 2018-02-23 | End: 2018-02-24

## 2018-02-23 RX ORDER — MISOPROSTOL 200 UG/1
800 TABLET ORAL
Status: DISCONTINUED | OUTPATIENT
Start: 2018-02-23 | End: 2018-02-24 | Stop reason: HOSPADM

## 2018-02-23 RX ORDER — METHYLERGONOVINE MALEATE 0.2 MG/ML
0.2 INJECTION INTRAVENOUS
Status: DISCONTINUED | OUTPATIENT
Start: 2018-02-23 | End: 2018-02-24 | Stop reason: HOSPADM

## 2018-02-23 RX ORDER — CARBOPROST TROMETHAMINE 250 UG/ML
250 INJECTION, SOLUTION INTRAMUSCULAR
Status: DISCONTINUED | OUTPATIENT
Start: 2018-02-23 | End: 2018-02-24 | Stop reason: HOSPADM

## 2018-02-23 RX ORDER — SODIUM CHLORIDE, SODIUM LACTATE, POTASSIUM CHLORIDE, CALCIUM CHLORIDE 600; 310; 30; 20 MG/100ML; MG/100ML; MG/100ML; MG/100ML
INJECTION, SOLUTION INTRAVENOUS
Status: COMPLETED
Start: 2018-02-23 | End: 2018-02-23

## 2018-02-23 RX ORDER — DEXTROSE, SODIUM CHLORIDE, SODIUM LACTATE, POTASSIUM CHLORIDE, AND CALCIUM CHLORIDE 5; .6; .31; .03; .02 G/100ML; G/100ML; G/100ML; G/100ML; G/100ML
INJECTION, SOLUTION INTRAVENOUS CONTINUOUS
Status: DISCONTINUED | OUTPATIENT
Start: 2018-02-24 | End: 2018-02-24 | Stop reason: HOSPADM

## 2018-02-23 RX ORDER — ALUMINA, MAGNESIA, AND SIMETHICONE 2400; 2400; 240 MG/30ML; MG/30ML; MG/30ML
30 SUSPENSION ORAL EVERY 6 HOURS PRN
Status: DISCONTINUED | OUTPATIENT
Start: 2018-02-23 | End: 2018-02-24 | Stop reason: HOSPADM

## 2018-02-23 RX ORDER — CITRIC ACID/SODIUM CITRATE 334-500MG
30 SOLUTION, ORAL ORAL EVERY 6 HOURS PRN
Status: DISCONTINUED | OUTPATIENT
Start: 2018-02-23 | End: 2018-02-24 | Stop reason: HOSPADM

## 2018-02-23 RX ADMIN — DINOPROSTONE 5 MG: 20 SUPPOSITORY VAGINAL at 21:16

## 2018-02-23 RX ADMIN — SODIUM CHLORIDE, POTASSIUM CHLORIDE, SODIUM LACTATE AND CALCIUM CHLORIDE: 600; 310; 30; 20 INJECTION, SOLUTION INTRAVENOUS at 22:58

## 2018-02-23 ASSESSMENT — COPD QUESTIONNAIRES
DO YOU EVER COUGH UP ANY MUCUS OR PHLEGM?: NO/ONLY WITH OCCASIONAL COLDS OR INFECTIONS
HAVE YOU SMOKED AT LEAST 100 CIGARETTES IN YOUR ENTIRE LIFE: NO/DON'T KNOW
DURING THE PAST 4 WEEKS HOW MUCH DID YOU FEEL SHORT OF BREATH: NONE/LITTLE OF THE TIME
COPD SCREENING SCORE: 0

## 2018-02-23 ASSESSMENT — PATIENT HEALTH QUESTIONNAIRE - PHQ9
SUM OF ALL RESPONSES TO PHQ QUESTIONS 1-9: 0
1. LITTLE INTEREST OR PLEASURE IN DOING THINGS: NOT AT ALL
2. FEELING DOWN, DEPRESSED, IRRITABLE, OR HOPELESS: NOT AT ALL
SUM OF ALL RESPONSES TO PHQ9 QUESTIONS 1 AND 2: 0

## 2018-02-23 ASSESSMENT — LIFESTYLE VARIABLES
EVER_SMOKED: NEVER
DO YOU DRINK ALCOHOL: NO
ALCOHOL_USE: NO

## 2018-02-24 PROCEDURE — 36415 COLL VENOUS BLD VENIPUNCTURE: CPT

## 2018-02-24 PROCEDURE — 700105 HCHG RX REV CODE 258: Performed by: NURSE PRACTITIONER

## 2018-02-24 PROCEDURE — 700102 HCHG RX REV CODE 250 W/ 637 OVERRIDE(OP): Performed by: NURSE PRACTITIONER

## 2018-02-24 PROCEDURE — A9270 NON-COVERED ITEM OR SERVICE: HCPCS | Performed by: STUDENT IN AN ORGANIZED HEALTH CARE EDUCATION/TRAINING PROGRAM

## 2018-02-24 PROCEDURE — 700105 HCHG RX REV CODE 258: Performed by: STUDENT IN AN ORGANIZED HEALTH CARE EDUCATION/TRAINING PROGRAM

## 2018-02-24 PROCEDURE — 303615 HCHG EPIDURAL/SPINAL ANESTHESIA FOR LABOR

## 2018-02-24 PROCEDURE — 700111 HCHG RX REV CODE 636 W/ 250 OVERRIDE (IP): Performed by: STUDENT IN AN ORGANIZED HEALTH CARE EDUCATION/TRAINING PROGRAM

## 2018-02-24 PROCEDURE — 700111 HCHG RX REV CODE 636 W/ 250 OVERRIDE (IP)

## 2018-02-24 PROCEDURE — 59409 OBSTETRICAL CARE: CPT

## 2018-02-24 PROCEDURE — 0KQM0ZZ REPAIR PERINEUM MUSCLE, OPEN APPROACH: ICD-10-PCS | Performed by: OBSTETRICS & GYNECOLOGY

## 2018-02-24 PROCEDURE — 700102 HCHG RX REV CODE 250 W/ 637 OVERRIDE(OP): Performed by: STUDENT IN AN ORGANIZED HEALTH CARE EDUCATION/TRAINING PROGRAM

## 2018-02-24 PROCEDURE — 770002 HCHG ROOM/CARE - OB PRIVATE (112)

## 2018-02-24 PROCEDURE — 304965 HCHG RECOVERY SERVICES

## 2018-02-24 PROCEDURE — A9270 NON-COVERED ITEM OR SERVICE: HCPCS | Performed by: NURSE PRACTITIONER

## 2018-02-24 PROCEDURE — 700111 HCHG RX REV CODE 636 W/ 250 OVERRIDE (IP): Performed by: NURSE PRACTITIONER

## 2018-02-24 RX ORDER — DOCUSATE SODIUM 100 MG/1
100 CAPSULE, LIQUID FILLED ORAL 2 TIMES DAILY PRN
Status: DISCONTINUED | OUTPATIENT
Start: 2018-02-24 | End: 2018-02-26 | Stop reason: HOSPADM

## 2018-02-24 RX ORDER — METHYLERGONOVINE MALEATE 0.2 MG/ML
0.2 INJECTION INTRAVENOUS
Status: DISCONTINUED | OUTPATIENT
Start: 2018-02-24 | End: 2018-02-26 | Stop reason: HOSPADM

## 2018-02-24 RX ORDER — BISACODYL 10 MG
10 SUPPOSITORY, RECTAL RECTAL PRN
Status: DISCONTINUED | OUTPATIENT
Start: 2018-02-24 | End: 2018-02-26 | Stop reason: HOSPADM

## 2018-02-24 RX ORDER — MISOPROSTOL 200 UG/1
600 TABLET ORAL
Status: DISCONTINUED | OUTPATIENT
Start: 2018-02-24 | End: 2018-02-26 | Stop reason: HOSPADM

## 2018-02-24 RX ORDER — HYDROCODONE BITARTRATE AND ACETAMINOPHEN 10; 325 MG/1; MG/1
1 TABLET ORAL EVERY 4 HOURS PRN
Status: DISCONTINUED | OUTPATIENT
Start: 2018-02-24 | End: 2018-02-26 | Stop reason: HOSPADM

## 2018-02-24 RX ORDER — ACETAMINOPHEN 325 MG/1
325 TABLET ORAL EVERY 4 HOURS PRN
Status: DISCONTINUED | OUTPATIENT
Start: 2018-02-24 | End: 2018-02-26 | Stop reason: HOSPADM

## 2018-02-24 RX ORDER — ROPIVACAINE HYDROCHLORIDE 2 MG/ML
INJECTION, SOLUTION EPIDURAL; INFILTRATION; PERINEURAL
Status: COMPLETED
Start: 2018-02-24 | End: 2018-02-24

## 2018-02-24 RX ORDER — IBUPROFEN 800 MG/1
800 TABLET ORAL EVERY 8 HOURS PRN
Status: DISCONTINUED | OUTPATIENT
Start: 2018-02-24 | End: 2018-02-26 | Stop reason: HOSPADM

## 2018-02-24 RX ORDER — ONDANSETRON 2 MG/ML
4 INJECTION INTRAMUSCULAR; INTRAVENOUS EVERY 6 HOURS PRN
Status: DISCONTINUED | OUTPATIENT
Start: 2018-02-24 | End: 2018-02-26 | Stop reason: HOSPADM

## 2018-02-24 RX ORDER — CARBOPROST TROMETHAMINE 250 UG/ML
250 INJECTION, SOLUTION INTRAMUSCULAR
Status: DISCONTINUED | OUTPATIENT
Start: 2018-02-24 | End: 2018-02-26 | Stop reason: HOSPADM

## 2018-02-24 RX ORDER — ACETAMINOPHEN 500 MG
500 TABLET ORAL EVERY 6 HOURS PRN
Status: DISCONTINUED | OUTPATIENT
Start: 2018-02-24 | End: 2018-02-26 | Stop reason: HOSPADM

## 2018-02-24 RX ORDER — AMPICILLIN 2 G/1
INJECTION, POWDER, FOR SOLUTION INTRAVENOUS
Status: COMPLETED
Start: 2018-02-24 | End: 2018-02-24

## 2018-02-24 RX ORDER — HYDROCODONE BITARTRATE AND ACETAMINOPHEN 5; 325 MG/1; MG/1
1 TABLET ORAL EVERY 4 HOURS PRN
Status: DISCONTINUED | OUTPATIENT
Start: 2018-02-24 | End: 2018-02-26 | Stop reason: HOSPADM

## 2018-02-24 RX ORDER — VITAMIN A ACETATE, BETA CAROTENE, ASCORBIC ACID, CHOLECALCIFEROL, .ALPHA.-TOCOPHEROL ACETATE, DL-, THIAMINE MONONITRATE, RIBOFLAVIN, NIACINAMIDE, PYRIDOXINE HYDROCHLORIDE, FOLIC ACID, CYANOCOBALAMIN, CALCIUM CARBONATE, FERROUS FUMARATE, ZINC OXIDE, CUPRIC OXIDE 3080; 12; 120; 400; 1; 1.84; 3; 20; 22; 920; 25; 200; 27; 10; 2 [IU]/1; UG/1; MG/1; [IU]/1; MG/1; MG/1; MG/1; MG/1; MG/1; [IU]/1; MG/1; MG/1; MG/1; MG/1; MG/1
1 TABLET, FILM COATED ORAL EVERY MORNING
Status: DISCONTINUED | OUTPATIENT
Start: 2018-02-25 | End: 2018-02-26 | Stop reason: HOSPADM

## 2018-02-24 RX ORDER — METOCLOPRAMIDE HYDROCHLORIDE 5 MG/ML
10 INJECTION INTRAMUSCULAR; INTRAVENOUS EVERY 6 HOURS PRN
Status: DISCONTINUED | OUTPATIENT
Start: 2018-02-24 | End: 2018-02-26 | Stop reason: HOSPADM

## 2018-02-24 RX ORDER — ONDANSETRON 4 MG/1
4 TABLET, ORALLY DISINTEGRATING ORAL EVERY 6 HOURS PRN
Status: DISCONTINUED | OUTPATIENT
Start: 2018-02-24 | End: 2018-02-26 | Stop reason: HOSPADM

## 2018-02-24 RX ORDER — SODIUM CHLORIDE, SODIUM LACTATE, POTASSIUM CHLORIDE, CALCIUM CHLORIDE 600; 310; 30; 20 MG/100ML; MG/100ML; MG/100ML; MG/100ML
INJECTION, SOLUTION INTRAVENOUS PRN
Status: DISCONTINUED | OUTPATIENT
Start: 2018-02-24 | End: 2018-02-26 | Stop reason: HOSPADM

## 2018-02-24 RX ADMIN — IBUPROFEN 800 MG: 800 TABLET, FILM COATED ORAL at 20:55

## 2018-02-24 RX ADMIN — FENTANYL CITRATE 100 MCG: 50 INJECTION, SOLUTION INTRAMUSCULAR; INTRAVENOUS at 04:45

## 2018-02-24 RX ADMIN — Medication 2000 ML/HR: at 19:07

## 2018-02-24 RX ADMIN — SODIUM CHLORIDE, POTASSIUM CHLORIDE, SODIUM LACTATE AND CALCIUM CHLORIDE: 600; 310; 30; 20 INJECTION, SOLUTION INTRAVENOUS at 04:48

## 2018-02-24 RX ADMIN — ROPIVACAINE HYDROCHLORIDE 100 ML: 2 INJECTION, SOLUTION EPIDURAL; INFILTRATION; PERINEURAL at 19:08

## 2018-02-24 RX ADMIN — Medication 125 ML/HR: at 19:12

## 2018-02-24 RX ADMIN — ROPIVACAINE HYDROCHLORIDE 100 ML: 2 INJECTION, SOLUTION EPIDURAL; INFILTRATION; PERINEURAL at 07:46

## 2018-02-24 RX ADMIN — Medication 1 MILLI-UNITS/MIN: at 04:02

## 2018-02-24 RX ADMIN — GENTAMICIN SULFATE 400 MG: 40 INJECTION, SOLUTION INTRAMUSCULAR; INTRAVENOUS at 18:39

## 2018-02-24 RX ADMIN — AMPICILLIN SODIUM 2 G: 2 INJECTION, POWDER, FOR SOLUTION INTRAMUSCULAR; INTRAVENOUS at 17:09

## 2018-02-24 RX ADMIN — ACETAMINOPHEN 500 MG: 500 TABLET, FILM COATED ORAL at 17:06

## 2018-02-24 RX ADMIN — SODIUM CHLORIDE, SODIUM LACTATE, POTASSIUM CHLORIDE, CALCIUM CHLORIDE AND DEXTROSE MONOHYDRATE: 5; 600; 310; 30; 20 INJECTION, SOLUTION INTRAVENOUS at 10:20

## 2018-02-24 ASSESSMENT — PAIN SCALES - GENERAL
PAINLEVEL_OUTOF10: 5
PAINLEVEL_OUTOF10: 6
PAINLEVEL_OUTOF10: 0

## 2018-02-24 NOTE — CARE PLAN
Problem: Pain  Goal: Alleviation of Pain or a reduction in pain to the patient's comfort goal  Outcome: PROGRESSING AS EXPECTED  Patient resting comfortably In between UC's, states would like epidural when it is time. Educated on pain management options. Verbalized understanding.    Problem: Risk for Infection, Impaired Wound Healing  Goal: Remain free from signs and symptoms of infection  Outcome: PROGRESSING AS EXPECTED  Patient afebrile upon assessment, no s/sx of infection. Instructed on proper hand hygiene, verbalized understanding.

## 2018-02-24 NOTE — PROGRESS NOTES
0700- report received from Alivia BOYER, POC discussed  0730- Dr Heart in room to place epidural. Pt tolerated procedure well, no complications with test dose  0930- late decelerations noted, pt repositioned side to side, Dr Negron updated. O2 placed by mask at 10L, IV open at 0945. Decelerations resolve at 1000.   1250- Report to Maynor BOYER

## 2018-02-24 NOTE — H&P
History and Physical    Yousuf Miller is a 37 y.o. female  -Para:     Gestational Age:  40w4d  Admitted for:   Induction of Labor, PIH  Admitted to  [facility].  Patient [facility]: [facility] care at 17 weeks and 2 days    HPI: Patient is admitted with the above mentioned Chief Complaint and States   Loss of fluid:   negative  Abdominal Pain:  negative  Uterine Contractions:  negative  Vaginal Bleeding:  negative  Fetal Movement:  normal  Patient denies fever, chills, nausea, vomiting , headache, visual disturbance, or dysuria  Patient's last menstrual period was 05/15/2017 (exact date).  Estimated Date of Delivery: 18  Final ERICKA: 2018, by Last Menstrual Period    Patient Active Problem List    Diagnosis Date Noted   • Encounter for supervision of normal first pregnancy in second trimester 2017   • AMA (advanced maternal age) primigravida 35+ 2017       Admitting DX: Pregnancy  GEL Induction for post dates   Labor and delivery, indication for care  Pregnancy Complications:  none  OB Risk Factors:   advanced maternal age, PIH  Labor State:    Not in labor.    History:   has no past medical history of Addisons disease (CMS-HCC); Adrenal disorder (CMS-HCC); Allergy; Anemia; Anxiety; Arrhythmia; Arthritis; Asthma; Blood transfusion without reported diagnosis; Cancer (CMS-HCC); Cataract; CHF (congestive heart failure) (CMS-HCC); Clotting disorder (CMS-HCC); COPD (chronic obstructive pulmonary disease) (CMS-HCC); Cushings syndrome (CMS-HCC); Depression; Diabetes (CMS-HCC); Diabetic neuropathy (CMS-HCC); GERD (gastroesophageal reflux disease); Glaucoma; Goiter; Headache(784.0); Heart attack; Heart murmur; HIV (human immunodeficiency virus infection) (CMS-HCC); Hyperlipidemia; Hypertension; IBD (inflammatory bowel disease); Kidney disease; Meningitis; Migraine; Muscle disorder; Osteoporosis; Parathyroid disorder (CMS-HCC); Pituitary disease  "(CMS-HCC); Pulmonary emphysema (CMS-HCC); Seizure (CMS-HCC); Sickle cell disease (CMS-HCC); Stroke (CMS-HCC); Substance abuse; Thyroid disease; Tuberculosis; Ulcer (CMS-HCC); or Urinary tract infection, site not specified.     has no past surgical history on file.    OB History    Para Term  AB Living   1 0           SAB TAB Ectopic Molar Multiple Live Births                    # Outcome Date GA Lbr Tato/2nd Weight Sex Delivery Anes PTL Lv   1 Current                   Medications:  No current facility-administered medications on file prior to encounter.      Current Outpatient Prescriptions on File Prior to Encounter   Medication Sig Dispense Refill   • Prenatal MV-Min-Fe Fum-FA-DHA (PRENATAL 1 PO) Take  by mouth.         Allergies:  Patient has no known allergies.    ROS:   Neuro: negative    Cardiovascular: negative  Gastro intestinal: negative  Genitourinary: negative            Physical Exam:  /87   Pulse 87   Temp 37.2 °C (98.9 °F)   Resp 20   Ht 1.626 m (5' 4\")   Wt 108 kg (238 lb)   LMP 05/15/2017 (Exact Date)   BMI 40.85 kg/m²   Constitutional: healthy-appearing, Well-developed, well-nourished  No JVD: while supine  HEENT: PERRLA, EOMI, Sclera clear, anicteric and Oropharynx clear, no lesions  Breast Exam: negative  Cardio: regular rate and rhythm, S1, S2 normal, no murmur, click, rub or gallop  Lung: unlabored respirations, no intercostal retractions or accessory muscle use  Abdomen: abdomen is soft without significant tenderness, masses, organomegaly or guarding  Extremity: extremities, peripheral pulses and reflexes normal,  +1 edema feet lower legs and hands,  No redness or tenderness in the calves or thighs DTR's are 2+ bilat    Cervical Exam: 70%  Cervix Dilatation: 1  Station: negative 1  Pelvis: Normal  Fetal Assessment: Fetal heart variability: moderate  Fetal Heart Rate decelerations: none  Fetal Heart Rate accelerations: yes  Baseline FHR: 140 per minute  Uterine " contractions: irregular, every 7-12 minutes  Estimated Fetal Weight: 3500 - 4000g      Labs:  Recent Labs      02/23/18   2143   WBC  9.0   RBC  3.96*   HEMOGLOBIN  13.9   HEMATOCRIT  39.4   MCV  99.5*   MCH  35.1*   MCHC  35.3*   RDW  44.8   PLATELETCT  158*   MPV  12.1     Prenatal Results     1st Trimester     Test Value Date Time    ABO O  09/13/17 1224    RH POS  09/13/17 1224    Antibody NEG  09/13/17 1224    CBC/PLT/DIFF       HGB 13.9 g/dL 02/23/18 2143    Platelets 158 K/uL (L) 02/23/18 2143    HGB A1C        1 Hr  mg/dL 11/14/17 1025    3 Hr GTT       Rubella 184.70 IU/mL 09/13/17 1224    RPR       Urine Culture       24 Urine Protein        24 Urine Creatinine        HBsAg Negative  09/13/17 1224    Hep CAB        HIV       Gonorrhea       Chlamydia       TSH        Free T4         TB       Pap       SYPHILUS TREP QUAL [id number] [[id number]][             2nd Trimester     Test Value Date Time    HCT 39.4 % 02/23/18 2143    HGB 13.9 g/dL 02/23/18 2143    1 Hr  mg/dL 11/14/17 1025    3 Hr GTT             24-28 Weeks     Test Value Date Time    1 Hr GCT  123 mg/dL 11/14/17 1025    TSH        Free T4        24 Urine Protein       24 Urine Creatinine       BUN 12 mg/dL 02/23/18 2143    Creatinine 0.53 mg/dL 02/23/18 2143    GFR >60 mL/min/1.73 m 2 02/23/18 2143    AST 15 U/L 02/23/18 2143    ALT       Uric Acid 5.0 mg/dL 02/23/18 2143    LDH             3rd Trimester     Test Value Date Time    HCT 39.4 % 02/23/18 2143    HGB 13.9 g/dL 02/23/18 2143    TSH       Free T4       24 Hr Urine Protein       24 Hr Urine Creatinine       SYPHILUS TREP QUAL Non Reactive  11/14/17 1025          35-37 Weeks     Test Value Date Time    GBS PCR LB Negative  01/16/18 1027    GBS PCR Negative  01/16/18 1027          Genetic Screening     Test Value Date Time    Cystic Fibrosis       AFP Quad Normal  09/13/17 1224    Sickle Cell                     Assessment:  Gestational Age:  40w4d  Labor State:   IOL for 40w 4d    Risk Factors:   advanced maternal age, PIH came in tonight with elevated B/P's   CBC has low platelets at 158 down from 230 with prenatal panel, all other labs are normal, U/S dip was +1 protein, Pt was to get gel and go home and RT L&D tomorrow but due to Elevated pressures will keep over night  Pregnancy Complications: none    Patient Active Problem List    Diagnosis Date Noted   • Encounter for supervision of normal first pregnancy in second trimester 2017   • AMA (advanced maternal age) primigravida 35+ 2017       Plan:   Admitted for: Induction of Labor   Prostin Gel placed    Anticipate    CBC, CMP, UA and Hold clot, PIH labs   Antibiotics: not needed for now GBS negative     Karin Main, A.P.N.

## 2018-02-24 NOTE — PROGRESS NOTES
1300- report received from BELLA Tillman RN, accepted care of pt. Pt has epidural, denies pain but states she feels pressure. Call light in reach.   1345- pt states she is having pain and pressure in lower back and abdomen. Called Dr. Heart, at bedside to evaluate pt.   1558- SVE complete/+1. Oral temp 100.0F and mec. Updated Dr. Dietz, will place orders in computer.   1608- pt pushing.   1630- Dr. Dietz and Dr. Negron at bedside to evaluate progress.   1700- Dr. Dietz and Dr. Negron at bedside to attend delivery. Oral temp 101.4. abx and tylenol per MAR.   1805- delivery of male infant, baby to bed care for by  MERLIN Schwartz and RT Nichole.   2000- fundus firm, lochia light. Pt denies pain at this time. Pt eating dinner.   2025- report to DANIEL Bain RN.

## 2018-02-24 NOTE — PROGRESS NOTES
- Report from GONZALO Messina RN. Pt , EDC  40.4 weeks. Pt came in for prostaglandin placement, was found to have increased BP's. PIH labs drawn, urine dip was +1 protein, due to elevated pressures patient to stay. Pt to move to S217 at 1 hour tashi of prostaglandin placed.    - Patient moved to S217, educated on POC, plan to recheck at 0300. Previous SVE per DANIEL LOCKHARTM /-1 ()  0200 - Patient sleeping, resting comfortably at this time  0330 - DANIEL Main CNM at bedside SVE /-1  0402 - Pitocin started  0445 - Patient in increased pain, would desire epidural at some point. Given fentanyl.   0625 - Patient desiring epidural, bolus for epidural started  0630 - SVE /-2  0700 - Report to Sherry BOYER POC discussed

## 2018-02-24 NOTE — PROGRESS NOTES
[facility] LABOR AND DELIVERY PROGRESS NOTE    PATIENT ID:  NAME:  Yousuf Miller  MRN:               [id number]  YOB: 1980     37 y.o. female  at 40w5d.    Subjective: Pt is comfortable with Epidural now resting FOB at bedside    Objective:    Vitals:    18 0548 18 0647 18 0706 18 0711   BP: 128/79 128/81  156/92   Pulse: 95 90 85 91   Resp:       Temp:       SpO2:   98% 97%   Weight:       Height:           Cervix:  2/80/-1cm at last RN check 0430  Omega: Uterine Contractions Q2-3 minutes, 60 seconds long.   FHRM: Baseline 140, mod variability, Accels +,  None decels  Pitocin: 4milliunits  Pain control: epidural    Assessment: 37 y.o. female    at 40w5d.    Plan:   1. Labor: early  2. IUP:  EFW 3800 gm approx, Category 1 tracing, vertex presentation.  GBS neg  3. Anticipate   4. Report to Dr Negron and Dr Dietz

## 2018-02-25 LAB
ERYTHROCYTE [DISTWIDTH] IN BLOOD BY AUTOMATED COUNT: 46.5 FL (ref 35.9–50)
HCT VFR BLD AUTO: 34.8 % (ref 37–47)
HGB BLD-MCNC: 11.8 G/DL (ref 12–16)
MCH RBC QN AUTO: 34.3 PG (ref 27–33)
MCHC RBC AUTO-ENTMCNC: 33.9 G/DL (ref 33.6–35)
MCV RBC AUTO: 101.2 FL (ref 81.4–97.8)
PLATELET # BLD AUTO: 133 K/UL (ref 164–446)
PMV BLD AUTO: 11.5 FL (ref 9–12.9)
RBC # BLD AUTO: 3.44 M/UL (ref 4.2–5.4)
WBC # BLD AUTO: 13.7 K/UL (ref 4.8–10.8)

## 2018-02-25 PROCEDURE — 700112 HCHG RX REV CODE 229: Performed by: OBSTETRICS & GYNECOLOGY

## 2018-02-25 PROCEDURE — 700105 HCHG RX REV CODE 258: Performed by: STUDENT IN AN ORGANIZED HEALTH CARE EDUCATION/TRAINING PROGRAM

## 2018-02-25 PROCEDURE — 700111 HCHG RX REV CODE 636 W/ 250 OVERRIDE (IP): Performed by: STUDENT IN AN ORGANIZED HEALTH CARE EDUCATION/TRAINING PROGRAM

## 2018-02-25 PROCEDURE — 85027 COMPLETE CBC AUTOMATED: CPT

## 2018-02-25 PROCEDURE — 700102 HCHG RX REV CODE 250 W/ 637 OVERRIDE(OP): Performed by: NURSE PRACTITIONER

## 2018-02-25 PROCEDURE — A9270 NON-COVERED ITEM OR SERVICE: HCPCS | Performed by: OBSTETRICS & GYNECOLOGY

## 2018-02-25 PROCEDURE — 700105 HCHG RX REV CODE 258: Performed by: OBSTETRICS & GYNECOLOGY

## 2018-02-25 PROCEDURE — 770002 HCHG ROOM/CARE - OB PRIVATE (112)

## 2018-02-25 PROCEDURE — A9270 NON-COVERED ITEM OR SERVICE: HCPCS | Performed by: NURSE PRACTITIONER

## 2018-02-25 PROCEDURE — 700102 HCHG RX REV CODE 250 W/ 637 OVERRIDE(OP): Performed by: OBSTETRICS & GYNECOLOGY

## 2018-02-25 PROCEDURE — 36415 COLL VENOUS BLD VENIPUNCTURE: CPT

## 2018-02-25 RX ADMIN — SODIUM CHLORIDE, POTASSIUM CHLORIDE, SODIUM LACTATE AND CALCIUM CHLORIDE: 600; 310; 30; 20 INJECTION, SOLUTION INTRAVENOUS at 06:44

## 2018-02-25 RX ADMIN — AMPICILLIN SODIUM 2000 MG: 2 INJECTION, POWDER, FOR SOLUTION INTRAMUSCULAR; INTRAVENOUS at 06:45

## 2018-02-25 RX ADMIN — GENTAMICIN SULFATE 400 MG: 40 INJECTION, SOLUTION INTRAMUSCULAR; INTRAVENOUS at 18:52

## 2018-02-25 RX ADMIN — DOCUSATE SODIUM 100 MG: 100 CAPSULE ORAL at 07:58

## 2018-02-25 RX ADMIN — IBUPROFEN 800 MG: 800 TABLET, FILM COATED ORAL at 07:58

## 2018-02-25 RX ADMIN — Medication 1 TABLET: at 07:58

## 2018-02-25 RX ADMIN — AMPICILLIN SODIUM 2000 MG: 2 INJECTION, POWDER, FOR SOLUTION INTRAMUSCULAR; INTRAVENOUS at 21:20

## 2018-02-25 RX ADMIN — AMPICILLIN SODIUM 2000 MG: 2 INJECTION, POWDER, FOR SOLUTION INTRAMUSCULAR; INTRAVENOUS at 00:26

## 2018-02-25 RX ADMIN — AMPICILLIN SODIUM 2000 MG: 2 INJECTION, POWDER, FOR SOLUTION INTRAMUSCULAR; INTRAVENOUS at 12:00

## 2018-02-25 RX ADMIN — DOCUSATE SODIUM 100 MG: 100 CAPSULE ORAL at 21:20

## 2018-02-25 ASSESSMENT — PAIN SCALES - GENERAL
PAINLEVEL_OUTOF10: 0
PAINLEVEL_OUTOF10: 0
PAINLEVEL_OUTOF10: 3
PAINLEVEL_OUTOF10: 2

## 2018-02-25 NOTE — DELIVERY NOTE
Vaginal Delivery Note    Yousuf Miller is a  2, now para 1, who presented not in labor at 40w5d.  Patient progressed in active labor with pitocin augmentation/induction to cervical exam 100%; cervical dilation 10; station 0 to complete the first stage of labor.  Patient then progressed through second stage and delivered spontaneously a viable male infant over a 2 nd degree lacerated perineum.  Nuchal cord not present.  Infant Apgar 8 and 8, and weight pending.    During the third stage the placenta was delivered spontaneously and was intact with 3 vessels    Assisted extraction:  none    Perineum repair:  laceration repaired with 2-0 Chromic    Analgesia:  none    Epidural:  yes    Family support:  yes    Infant bonding:  excellent    Estimated blood loss:  400 mL    Sponge count correct:  yes    Patient tolerated the procedure:  excellent

## 2018-02-25 NOTE — PROGRESS NOTES
"Pharmacy Kinetics 37 y.o. female on gentamicin day # 1 2018    Dosing Weight: 80  Currently on Gentamicin 400 mg iv q24hr    Indication for treatment: possible chorioamnionitis    Pertinent history per medical record: Admitted on 2018 for induction of labor at 40 wk 4 days.  Advance maternal age.   GBS negative.  Epidural.    Other antibiotics: Ampicillin 2 g IV q 6 hours    Allergies: Patient has no known allergies.     List concerns for renal function: obesity    Pertinent cultures to date:   None reporting    Recent Labs      18   2143   WBC  9.0     Recent Labs      183   BUN  12   CREATININE  0.53     No results for input(s): GENTTROUGH, GENTPEAK, GENTRANDOM in the last 72 hours.  Intake/Output Summary (Last 24 hours) at 18 1627  Last data filed at 18 1300   Gross per 24 hour   Intake             1135 ml   Output                0 ml   Net             1135 ml      Blood pressure 132/82, pulse 94, temperature 36.8 °C (98.2 °F), resp. rate 20, height 1.626 m (5' 4\"), weight 108 kg (238 lb), last menstrual period 05/15/2017, SpO2 96 %. Temp (24hrs), Av.7 °C (98.1 °F), Min:36.3 °C (97.4 °F), Max:37.2 °C (98.9 °F)      A/P   1. Gentamicin dose change: started perprotocol  2. Next gentamicin level: will order if cont'd > 48 hours  3. Goal trough: < 0.18 mcg/mL  4. Comments: I/O data incomplete.  Afebrile.  Monitor for duration.    YULIANA Vale, PharmD, BCPS  "

## 2018-02-25 NOTE — PROGRESS NOTES
To L&D to  pt. Bedside report received from MERLIN Del Valle. Assumed pt. Care. Pt  AAO x4. VSS. Responds appropriately. Denies SOB. Assessment complete. Fundus firm, lochia light.  Assisted pt to bathroom. Pt ambulated with a steady gait.   Transferred pt to postpartum bed 330. Discussed POC, pain control, feedings, mobility, safety, DC planning , pt verbalizes understanding. Call light and belongings within reach. Bed in the lowest position. Treaded socks in place. Will continue to monitor .

## 2018-02-25 NOTE — CARE PLAN
Problem: Altered physiologic condition related to immediate post-delivery state and potential for bleeding/hemorrhage  Goal: Patient physiologically stable as evidenced by normal lochia, palpable uterine involution and vital signs within normal limits    Intervention: Massage fundus as necessary to prevent excessive lochia  Lochia light. Fundus firm.       Problem: Potential anxiety related to difficulty adapting to parental role  Goal: Patient will verbalize and demonstrate effective bonding and parenting behavior    Intervention: Provide emotional support and encouragement to patient and family  [place] under [place] in the NBN at this time. MOB and FOB visiting and bonding.

## 2018-02-25 NOTE — PROGRESS NOTES
Post Partum Progress Note    Name:   Yousuf Miller   Date/Time:  2/25/2018 - 6:50 AM  Chief Admitting Dx:  Pregnancy  GEL  Labor and delivery, indication for care  Labor and delivery, indication for care  Delivery Type:  vaginal, spontaneous  Post-Op/Post Partum Days #:  1    Subjective:  Abdominal pain: no  Ambulating:   yes  Tolerating liquids:  yes  Tolerating food:  yes common adult  Flatus:   yes  BM:    no  Bleeding:   without any bleeding  Voiding:   yes  Dizziness:   no  Feeding:   breast    Vitals:    02/24/18 1715 02/24/18 2100 02/25/18 0000 02/25/18 0400   BP:  144/93 119/75 113/70   Pulse:  94 77 68   Resp:  20 18 16   Temp: (!) 38.6 °C (101.4 °F) 37.4 °C (99.3 °F) 37 °C (98.6 °F) 36.7 °C (98 °F)   TempSrc:       SpO2:  92% 92% 96%   Weight:       Height:           Exam:  Breast: Tenderness no  Abdomen: Abdomen soft, non-tender. BS normal. No masses,  No organomegaly  Fundal Tenderness:  no  Fundus Firm: no  Incision: none  Below umbilicus: yes  Perineum: perineum intact  Lochia: moderate  Extremities: Normal extremities, peripheral pulses and reflexes normal    Meds:  Current Facility-Administered Medications   Medication Dose   • ondansetron (ZOFRAN ODT) dispertab 4 mg  4 mg    Or   • ondansetron (ZOFRAN) syringe/vial injection 4 mg  4 mg   • metoclopramide (REGLAN) injection 10 mg  10 mg   • oxytocin (PITOCIN) infusion (for postpartum)   mL/hr   • ibuprofen (MOTRIN) tablet 800 mg  800 mg   • acetaminophen (TYLENOL) tablet 325 mg  325 mg   • HYDROcodone-acetaminophen (NORCO) 5-325 MG per tablet 1 Tab  1 Tab   • HYDROcodone/acetaminophen (NORCO)  MG per tablet 1 Tab  1 Tab   • ampicillin (OMNIPEN) 2,000 mg in  mL IVPB  2,000 mg   • MD ALERT... gentamicin per pharmacy protocol     • acetaminophen (TYLENOL) tablet 500 mg  500 mg   • gentamicin (GARAMYCIN) 400 mg in  mL IVPB  5 mg/kg (Order-Specific)   • LR infusion     • PRN oxytocin (PITOCIN) (20 Units/1000 mL) PRN for  excessive uterine bleeding - See Admin Instr  125-999 mL/hr   • miSOPROStol (CYTOTEC) tablet 600 mcg  600 mcg   • methylergonovine (METHERGINE) injection 0.2 mg  0.2 mg   • carboPROST (HEMABATE) injection 250 mcg  250 mcg   • docusate sodium (COLACE) capsule 100 mg  100 mg   • bisacodyl (DULCOLAX) suppository 10 mg  10 mg   • prenatal plus vitamin (STUARTNATAL 1+1) 27-1 MG tablet 1 Tab  1 Tab       Labs:   Recent Labs      02/23/18   2143  02/25/18   0407   WBC  9.0  13.7*   RBC  3.96*  3.44*   HEMOGLOBIN  13.9  11.8*   HEMATOCRIT  39.4  34.8*   MCV  99.5*  101.2*   MCH  35.1*  34.3*   MCHC  35.3*  33.9   RDW  44.8  46.5   PLATELETCT  158*  133*   MPV  12.1  11.5       Assessment:  Chief Admitting Dx:  Pregnancy  GEL  Labor and delivery, indication for care  Labor and delivery, indication for care  Delivery Type:  vaginal, spontaneous  Tubal Ligation:  no    Plan:  Continue routine post partum care.  chorio 24 hours abx      Afua Negron M.D.

## 2018-02-25 NOTE — PROGRESS NOTES
"Pharmacy Kinetics 37 y.o. female on gentamicin day # 2  2018       Dosing Weight: 80kg  Currently on Gentamicin 400 mg iv q24hr     Indication for treatment: possible chorioamnionitis     Pertinent history per medical record: Admitted on 2018 for induction of labor at 40 wk 4 days.  Advance maternal age.   GBS negative.  Epidural.     Other antibiotics: Ampicillin 2 g IV q 6 hours     Allergies: Patient has no known allergies.      List concerns for renal function: obesity (BMI~40)     Pertinent cultures to date:   None reporting    Recent Labs      183  18   0407   WBC  9.0  13.7*     Recent Labs      183   BUN  12   CREATININE  0.53     No results for input(s): GENTTROUGH, GENTPEAK, GENTRANDOM in the last 72 hours.No intake or output data in the 24 hours ending 18 1518   Blood pressure 120/86, pulse 81, temperature 36.9 °C (98.4 °F), resp. rate 18, height 1.626 m (5' 4\"), weight 108 kg (238 lb), last menstrual period 05/15/2017, SpO2 95 %. Temp (24hrs), Av.3 °C (99.1 °F), Min:36.7 °C (98 °F), Max:38.6 °C (101.4 °F)      A/P   1. Gentamicin dose change: continue current dosing  2. Next gentamicin level: will order if abx >48hrs  3. Goal trough: undetectable  4. Comments: MD plan is 24 hours of iv antibiotics for suspected chorio.    Rosa M Dumont, PharmD  "

## 2018-02-26 VITALS
SYSTOLIC BLOOD PRESSURE: 140 MMHG | RESPIRATION RATE: 18 BRPM | HEIGHT: 64 IN | WEIGHT: 238 LBS | BODY MASS INDEX: 40.63 KG/M2 | TEMPERATURE: 97.8 F | OXYGEN SATURATION: 97 % | DIASTOLIC BLOOD PRESSURE: 91 MMHG | HEART RATE: 87 BPM

## 2018-02-26 PROCEDURE — 700102 HCHG RX REV CODE 250 W/ 637 OVERRIDE(OP): Performed by: NURSE PRACTITIONER

## 2018-02-26 PROCEDURE — 700102 HCHG RX REV CODE 250 W/ 637 OVERRIDE(OP): Performed by: OBSTETRICS & GYNECOLOGY

## 2018-02-26 PROCEDURE — A9270 NON-COVERED ITEM OR SERVICE: HCPCS | Performed by: OBSTETRICS & GYNECOLOGY

## 2018-02-26 PROCEDURE — 700105 HCHG RX REV CODE 258: Performed by: STUDENT IN AN ORGANIZED HEALTH CARE EDUCATION/TRAINING PROGRAM

## 2018-02-26 PROCEDURE — 700111 HCHG RX REV CODE 636 W/ 250 OVERRIDE (IP): Performed by: STUDENT IN AN ORGANIZED HEALTH CARE EDUCATION/TRAINING PROGRAM

## 2018-02-26 PROCEDURE — A9270 NON-COVERED ITEM OR SERVICE: HCPCS | Performed by: NURSE PRACTITIONER

## 2018-02-26 RX ORDER — PSEUDOEPHEDRINE HCL 30 MG
100 TABLET ORAL 2 TIMES DAILY PRN
Qty: 60 CAP | Refills: 6 | Status: SHIPPED | OUTPATIENT
Start: 2018-02-26 | End: 2019-07-11

## 2018-02-26 RX ORDER — IBUPROFEN 800 MG/1
800 TABLET ORAL EVERY 8 HOURS PRN
Qty: 30 TAB | Refills: 6 | Status: ON HOLD | OUTPATIENT
Start: 2018-02-26 | End: 2019-05-29

## 2018-02-26 RX ORDER — PSEUDOEPHEDRINE HCL 30 MG
100 TABLET ORAL 2 TIMES DAILY PRN
Qty: 60 CAP | Refills: 6 | Status: SHIPPED | OUTPATIENT
Start: 2018-02-26 | End: 2018-02-26

## 2018-02-26 RX ORDER — IBUPROFEN 800 MG/1
800 TABLET ORAL EVERY 8 HOURS PRN
Qty: 30 TAB | Refills: 6 | Status: SHIPPED | OUTPATIENT
Start: 2018-02-26 | End: 2018-02-26

## 2018-02-26 RX ADMIN — AMPICILLIN SODIUM 2000 MG: 2 INJECTION, POWDER, FOR SOLUTION INTRAMUSCULAR; INTRAVENOUS at 03:24

## 2018-02-26 RX ADMIN — IBUPROFEN 800 MG: 800 TABLET, FILM COATED ORAL at 00:07

## 2018-02-26 RX ADMIN — IBUPROFEN 800 MG: 800 TABLET, FILM COATED ORAL at 12:02

## 2018-02-26 RX ADMIN — Medication 1 TABLET: at 09:52

## 2018-02-26 ASSESSMENT — LIFESTYLE VARIABLES: EVER_SMOKED: NEVER

## 2018-02-26 ASSESSMENT — PAIN SCALES - GENERAL
PAINLEVEL_OUTOF10: 5
PAINLEVEL_OUTOF10: 0
PAINLEVEL_OUTOF10: 1
PAINLEVEL_OUTOF10: 3
PAINLEVEL_OUTOF10: 0

## 2018-02-26 NOTE — PROGRESS NOTES
1400- Discharge instructions given to patient who verbalized understanding and stated she has no questions.  Rx's handed to patient after named verified.  1430- Patient stated that she is ready for discharge.  Patient discharged to home, no change noted in condition, via wheelchair with infant and FOB.

## 2018-02-26 NOTE — DISCHARGE SUMMARY
Discharge Summary:      Yousuf Miller      Admit Date:   2018  Discharge Date:  2018     Admitting diagnosis:  Pregnancy  GEL  Labor and delivery, indication for care  Labor and delivery, indication for care  Discharge Diagnosis: Status post vaginal, spontaneous.  Pregnancy Complications: none  Tubal Ligation:  no        History:  History reviewed. No pertinent past medical history.  OB History    Para Term  AB Living   1 0           SAB TAB Ectopic Molar Multiple Live Births                    # Outcome Date GA Lbr Tato/2nd Weight Sex Delivery Anes PTL Lv   1 Current                    Patient has no known allergies.  Patient Active Problem List    Diagnosis Date Noted   • Encounter for supervision of normal first pregnancy in second trimester 2017   • AMA (advanced maternal age) primigravida 35+ 2017        Hospital Course:   37 y.o. , now para 1, was admitted with the above mentioned diagnosis, underwent Induction of Labor, vaginal, spontaneous. Patient postpartum course was unremarkable, with progressive advancement in diet , ambulation and toleration of oral analgesia. Patient without complaints today and desires discharge.      Vitals:    18 0000 18 0400 18 0800 18   BP: 119/75 113/70 120/86 124/91   Pulse: 77 68 81 92   Resp:    Temp: 37 °C (98.6 °F) 36.7 °C (98 °F) 36.9 °C (98.4 °F) 36.7 °C (98 °F)   TempSrc:       SpO2: 92% 96% 95% 95%   Weight:       Height:           Current Facility-Administered Medications   Medication Dose   • ondansetron (ZOFRAN ODT) dispertab 4 mg  4 mg    Or   • ondansetron (ZOFRAN) syringe/vial injection 4 mg  4 mg   • metoclopramide (REGLAN) injection 10 mg  10 mg   • oxytocin (PITOCIN) infusion (for postpartum)   mL/hr   • ibuprofen (MOTRIN) tablet 800 mg  800 mg   • acetaminophen (TYLENOL) tablet 325 mg  325 mg   • HYDROcodone-acetaminophen (NORCO) 5-325 MG per tablet 1 Tab  1 Tab   •  HYDROcodone/acetaminophen (NORCO)  MG per tablet 1 Tab  1 Tab   • ampicillin (OMNIPEN) 2,000 mg in  mL IVPB  2,000 mg   • MD ALERT... gentamicin per pharmacy protocol     • acetaminophen (TYLENOL) tablet 500 mg  500 mg   • gentamicin (GARAMYCIN) 400 mg in  mL IVPB  5 mg/kg (Order-Specific)   • LR infusion     • PRN oxytocin (PITOCIN) (20 Units/1000 mL) PRN for excessive uterine bleeding - See Admin Instr  125-999 mL/hr   • miSOPROStol (CYTOTEC) tablet 600 mcg  600 mcg   • methylergonovine (METHERGINE) injection 0.2 mg  0.2 mg   • carboPROST (HEMABATE) injection 250 mcg  250 mcg   • docusate sodium (COLACE) capsule 100 mg  100 mg   • bisacodyl (DULCOLAX) suppository 10 mg  10 mg   • prenatal plus vitamin (STUARTNATAL 1+1) 27-1 MG tablet 1 Tab  1 Tab       Exam:  Breast Exam: negative  Abdomen: Abdomen soft, non-tender. BS normal. No masses,  No organomegaly  Fundus Non Tender: yes  Incision: none  Perineum: perineum first degree  Extremity: extremities, peripheral pulses and reflexes normal, no edema, redness or tenderness in the calves or thighs     Labs:  Recent Labs      02/23/18   2143  02/25/18   0407   WBC  9.0  13.7*   RBC  3.96*  3.44*   HEMOGLOBIN  13.9  11.8*   HEMATOCRIT  39.4  34.8*   MCV  99.5*  101.2*   MCH  35.1*  34.3*   MCHC  35.3*  33.9   RDW  44.8  46.5   PLATELETCT  158*  133*   MPV  12.1  11.5        Activity:   Discharge to home  Pelvic Rest x 6 weeks    Assessment:  normal postpartum course  Discharge Assessment: No areas of skin breakdown/redness; surgical incision intact/healing     Follow up: .[facility] in 5 weeks for vaginal     Discharge Meds:   Current Outpatient Prescriptions   Medication Sig Dispense Refill   • ibuprofen (MOTRIN) 800 MG Tab Take 1 Tab by mouth every 8 hours as needed (For cramping after delivery; do not give if patient is receiving ketorolac (Toradol)). 30 Tab 6   • docusate sodium 100 MG Cap Take 100 mg by mouth 2 times a day as needed for Constipation. 60  Cap 6       TRACY Salas.

## 2018-02-26 NOTE — CARE PLAN
Problem: Communication  Goal: The ability to communicate needs accurately and effectively will improve  Reviewed plan of care with patient who verbalized understanding.    Problem: Altered physiologic condition related to immediate post-delivery state and potential for bleeding/hemorrhage  Goal: Patient physiologically stable as evidenced by normal lochia, palpable uterine involution and vital signs within normal limits  Outcome: PROGRESSING AS EXPECTED  Fundus firm.  Lochia scant.  VSS.    Problem: Potential for postpartum infection related to presence of episiotomy/vaginal tear and/or uterine contamination  Goal: Patient will be absent from signs and symptoms of infection  Outcome: PROGRESSING AS EXPECTED  Patient is afebrile.

## 2018-02-26 NOTE — PROGRESS NOTES
Report received from MERLIN Tay. Assumed pt care. Pt resting in bed. No s/s of distress noted. Will monitor.

## 2018-02-26 NOTE — DISCHARGE INSTRUCTIONS
POSTPARTUM DISCHARGE INSTRUCTIONS FOR MOM    YOB: 1980   Age: 37 y.o.               Admit Date: 2/23/2018     Discharge Date: 2/26/2018  Attending Doctor:  Lidia Ricketts M.D.                  Allergies:  Patient has no known allergies.    Discharged to home by car. Discharged via wheelchair, hospital escort: Yes.  Special equipment needed: Not Applicable  Belongings with: Personal  Be sure to schedule a follow-up appointment with your primary care doctor or any specialists as instructed.     Discharge Plan:   Diet Plan: Discussed  Activity Level: Discussed  Confirmed Follow up Appointment: Patient to Call and Schedule Appointment  Medication Reconciliation Updated: Yes  Influenza Vaccine Indication: Patient Refuses    REASONS TO CALL YOUR OBSTETRICIAN:  1.   Persistent fever or shaking chills (Temperature higher than 100.4)  2.   Heavy bleeding (soaking more than 1 pad per hour); Passing clots  3.   Foul odor from vagina  4.   Mastitis (Breast infection; breast pain, chills, fever, redness)  5.   Urinary pain, burning or frequency  6.   Episiotomy infection  7.   Severe depression longer than 24 hours    HAND WASHING  · Prior to handling the baby.  · Before breastfeeding or bottle feeding baby.  · After using the bathroom or changing the baby's diaper.    VAGINAL CARE  · Nothing inside vagina for 6 weeks: no sexual intercourse, tampons or douching.  · Bleeding may continue for 2-4 weeks.  Amount may vary.    · Call your physician for heavy bleeding which means soaking more than 1 pad per hour    BIRTH CONTROL  · It is possible to become pregnant at any time after delivery and while breastfeeding.  · Plan to discuss a method of birth control with your physician at your follow up visit. visit.    DIET AND ELIMINATION  · Eating more fiber (bran cereal, fruits, and vegetables) and drinking plenty of fluids will help to avoid constipation.  · Urinary frequency after childbirth is normal.    POSTPARTUM  "BLUES  During the first few days after birth, you may experience a sense of the \"blues\" which may include impatience, irritability or even crying.  These feeling come and go quickly.  However, as many as 1 in 10 women experience emotional symptoms known as postpartum depression.  Postpartum depression:  May start as early as the second or third day after delivery or take several weeks or months to develop.  Symptoms of \"blues\" are present, but are more intense:  Crying spells; loss of appetite; feelings of hopelessness or loss of control; fear of touching the baby; over concern or no concern at all about the baby; little or no concern about your own appearance/caring for yourself; and/or inability to sleep or excessive sleeping.  Contact your physician if you are experiencing any of these symptoms.  Crisis Hotline:  · [place] Crisis Hotline:  3-265-VHFRNGD  Or 1-530.721.2042  · [place] Crisis Hotline:  1-202.484.2291  Or 997-870-8564    PREVENTING SHAKEN BABY:  If you are angry or stressed, PUT THE BABY IN THE CRIB, step away, take some deep breaths, and wait until you are calm to care for the baby.  DO NOT SHAKE THE BABY.  You are not alone, call a supporter for help.  · Crisis Call Center 24/7 crisis line 931-615-3748 or 1-940.861.3035  · You can also text them, text \"ANSWER\" to 971978    QUIT SMOKING/TOBACCO USE:  I understand the use of any tobacco products increases my chance of suffering from future heart disease and could cause other illnesses which may shorten my life. Quitting the use of tobacco products is the single most important thing I can do to improve my health. For further information on smoking / tobacco cessation call a Toll Free Quit Line at 1-900.458.5259 (*[employer] [place]) or 1-467.814.9889 (American Lung Association) or you can access the web based program at www.lungusa.org.  · [place] Tobacco Users Help Line:  (534) 890-3283       Toll Free: 1-436.269.1170  · Quit Tobacco Program " [facility] (358)888-5535    DEPRESSION / SUICIDE RISK:  As you are discharged from this [facility], it is important to learn how to keep safe from harming yourself.    Recognize the warning signs:  · Abrupt changes in personality, positive or negative- including increase in energy   · Giving away possessions  · Change in eating patterns- significant weight changes-  positive or negative  · Change in sleeping patterns- unable to sleep or sleeping all the time   · Unwillingness or inability to communicate  · Depression  · Unusual sadness, discouragement and loneliness  · Talk of wanting to die  · Neglect of personal appearance   · Rebelliousness- reckless behavior  · Withdrawal from people/activities they love  · Confusion- inability to concentrate     If you or a loved one observes any of these behaviors or has concerns about self-harm, here's what you can do:  · Talk about it- your feelings and reasons for harming yourself  · Remove any means that you might use to hurt yourself (examples: pills, rope, extension cords, firearm)  · Get professional help from the community (Mental Health, Substance Abuse, psychological counseling)  · Do not be alone:Call your Safe Contact- someone whom you trust who will be there for you.  · Call your local CRISIS HOTLINE 681-2008 or 110-400-4933  · Call your local Children's Mobile Crisis Response Team [place] (616) 202-1201 or www.[employer]  · Call the toll free National Suicide Prevention Hotlines   · National Suicide Prevention Lifeline 031-171-ZSHW (1798)  · National Hope Line Network 800-SUICIDE (854-9906)    DISCHARGE SURVEY:  Thank you for choosing [facility].  We hope we provided you with very good care.  You may be receiving a survey.  Your opinion is valuable to us.    ADDITIONAL EDUCATIONAL MATERIALS GIVEN TO PATIENT: none    My signature on this form indicates that:  1.  I have reviewed and understand the above  information  2.  My questions regarding this information have been answered to my satisfaction.  3.  I have formulated a plan with my discharge nurse to obtain my prescribed medication for home.

## 2018-02-26 NOTE — PROGRESS NOTES
8143- Report received.  Patient sleeping.  2913- Patient assessment done.  Patient stated that she is voiding without difficulty and passing flatus.  Patient denied dizziness and stated that she is walking without difficulty.  Discussed pain management plan and patient prefers to call for pain intervention as needed.  Reviewed plan of care.  FOB at bedside.  1024- Dr. Triana notified that the patient has been afebrile since 2/24/18 at 1715 and that the patient has orders for discharge.  Telephone order received to discontinue IV antibiotics.  1150- Patient shown [facility] Transition to Home video and stated she has no questions at this time.

## 2018-02-26 NOTE — PROGRESS NOTES
Tara Bird R.N. Lactation Consultant Signed   Progress Notes Date of Service: 2/26/2018 10:30 AM         []Hide copied text  []Hover for attribution information  Follow up from yesterday. Mom/baby will go home today. Baby still isn't latching and has been supplemented according to the [place] guide. Discussed and gave her a written plan to try to breastfeed, supplement and pump. Discussed using liquid formula, storage and reheating. Had her show me on a Snappy the amount that her baby should get per the guide. Parents have decided to buy a pump for home rather than rent one.     Parents have Medicaid but don't want WIC. Discussed her resources for lactation. SHe will need to follow up for lactation with the GABY's at [facility]. They have the New Beginnings booklet and the BF  Booklet as lactation resources. They were educated about their OP Lactation resources.

## 2018-02-26 NOTE — PROGRESS NOTES
Tara Bird R.N. Lactation Consultant Signed   Progress Notes Date of Service: 2/25/2018  4:00 PM         []Hide copied text  []Hover for attribution information  Baby has been in the Level 2 nursery and mother has been trying to nurse baby without success. Her nipples appear edematous. Baby tries to latch but is unable to sustain a latch.  Baby will be 24 hrs at 1805 tonight.      Plan is to try to latch baby Q 3hrs. She will hand express and feed baby that colostrum, then supplement with donor milk or her pumped milk according to [facility] 10-20-30 guide.     Mom will pump. Settings: 80to 60, 20% suction for 15 min. In order to bring in her milk supply. If baby starts nursing will forego the pumping

## 2018-03-29 ENCOUNTER — POST PARTUM (OUTPATIENT)
Dept: OBGYN | Facility: CLINIC | Age: 38
End: 2018-03-29
Payer: MEDICAID

## 2018-03-29 VITALS — SYSTOLIC BLOOD PRESSURE: 110 MMHG | BODY MASS INDEX: 36.39 KG/M2 | WEIGHT: 212 LBS | DIASTOLIC BLOOD PRESSURE: 76 MMHG

## 2018-03-29 PROCEDURE — 90050 PR POSTPARTUM VISIT: CPT | Performed by: NURSE PRACTITIONER

## 2018-03-29 NOTE — PROGRESS NOTES
Pt here today for postpartum exam.  Delivery Date 2/24/18   Currently: Pt is both breast and bottle feeding, but mostly bottle.  BCM: Pt declines BC, [facility].   Good ph:[phone]  Pt states no complications   Last PAP 9/13/17

## 2018-03-29 NOTE — PROGRESS NOTES
Subjective:      Yousuf Miller is a 37 y.o. female who presents with No chief complaint on file.            HPI    ROS       Objective:     /76   Wt 96.2 kg (212 lb)   LMP 05/15/2017 (Exact Date)   Breastfeeding? Unknown   BMI 36.39 kg/m²      Physical Exam   Constitutional: She is oriented to person, place, and time. She appears well-developed and well-nourished.   HENT:   Head: Normocephalic.   Eyes: Pupils are equal, round, and reactive to light.   Neck: Normal range of motion. No thyromegaly present.   Cardiovascular: Normal rate, regular rhythm and normal heart sounds.    Pulmonary/Chest: Effort normal and breath sounds normal.   Abdominal: Soft. Bowel sounds are normal.   Genitourinary: Vagina normal and uterus normal.   Musculoskeletal: Normal range of motion.   Neurological: She is alert and oriented to person, place, and time.   Skin: Skin is warm and dry.   Psychiatric: She has a normal mood and affect. Her behavior is normal. Judgment and thought content normal.   Nursing note and vitals reviewed.              Assessment/Plan:     There are no diagnoses linked to this encounter.

## 2018-03-29 NOTE — PROGRESS NOTES
Subjective   Subjective:    Yousuf Miller is a 37 y.o. female who presents for her postpartum exam s/p  with second degree laceration.  Her prenatal course was complicated by advanced maternal age. Her postpartum course was uncomplicated . She denies dysuria, vaginal bleeding, odor, itching or breast problems. She is both breast and bottle feeding. She desires to conceive again and does not want contraception. Reports no sex prior to this appointment. Eating a regular diet without difficulty. Bowel movement are Normal.  The patient is not having any pain. No current menses. Patient Denies Incisional pain, drainage or redness. Patient denies postpartum depression.     Problem List     Patient Active Problem List    Diagnosis Date Noted   • Encounter for supervision of normal first pregnancy in second trimester 2017   • AMA (advanced maternal age) primigravida 35+ 2017       Objective    See PE  Lab: H&H upon discharge 11.8/34.8  Weight - 212 lb  Vitals - 110/76    Assessment   Assessment:    1. PP care of lactating women   2. Exam WNL   3. Pap WNL   4. Desires contraception - not at this time.     Plan   Plan:    1. Breastfeeding support   2. Continue PNV   3. Contraceptive counseling - Wants to conceive again.  4. Encouraged condom use prn  5. Discussed diet, exercise and resumption of sexual activity   6. Preconception guidance for next pregnancy if applicable. AMA as risk factors. Folic acid for all women of childbearing age.   7.  Follow up needed continue annual women's health.   8.  Smoking/etoh/drug screening no exposure.

## 2018-12-05 ENCOUNTER — NON-PROVIDER VISIT (OUTPATIENT)
Dept: OBGYN | Facility: CLINIC | Age: 38
End: 2018-12-05

## 2018-12-05 DIAGNOSIS — Z32.01 PREGNANCY EXAMINATION OR TEST, POSITIVE RESULT: ICD-10-CM

## 2018-12-05 LAB
INT CON NEG: NEGATIVE
INT CON POS: POSITIVE
POC URINE PREGNANCY TEST: POSITIVE

## 2018-12-05 PROCEDURE — 81025 URINE PREGNANCY TEST: CPT | Performed by: OBSTETRICS & GYNECOLOGY

## 2019-01-10 PROBLEM — O09.529 AMA (ADVANCED MATERNAL AGE) MULTIGRAVIDA 35+: Status: ACTIVE | Noted: 2019-01-10

## 2019-01-15 ENCOUNTER — HOSPITAL ENCOUNTER (OUTPATIENT)
Facility: MEDICAL CENTER | Age: 39
End: 2019-01-15
Attending: NURSE PRACTITIONER
Payer: COMMERCIAL

## 2019-01-15 ENCOUNTER — INITIAL PRENATAL (OUTPATIENT)
Dept: OBGYN | Facility: CLINIC | Age: 39
End: 2019-01-15

## 2019-01-15 VITALS
DIASTOLIC BLOOD PRESSURE: 74 MMHG | WEIGHT: 232 LBS | BODY MASS INDEX: 39.61 KG/M2 | SYSTOLIC BLOOD PRESSURE: 118 MMHG | HEIGHT: 64 IN

## 2019-01-15 DIAGNOSIS — Z3A.18 18 WEEKS GESTATION OF PREGNANCY: ICD-10-CM

## 2019-01-15 DIAGNOSIS — O09.529 ANTEPARTUM MULTIGRAVIDA OF ADVANCED MATERNAL AGE: ICD-10-CM

## 2019-01-15 LAB
APPEARANCE UR: NORMAL
BILIRUB UR STRIP-MCNC: NORMAL MG/DL
COLOR UR AUTO: NORMAL
GLUCOSE UR STRIP.AUTO-MCNC: NEGATIVE MG/DL
KETONES UR STRIP.AUTO-MCNC: NORMAL MG/DL
LEUKOCYTE ESTERASE UR QL STRIP.AUTO: NEGATIVE
NITRITE UR QL STRIP.AUTO: NEGATIVE
PH UR STRIP.AUTO: 5.5 [PH] (ref 5–8)
PROT UR QL STRIP: NEGATIVE MG/DL
RBC UR QL AUTO: NEGATIVE
SP GR UR STRIP.AUTO: 1.02
UROBILINOGEN UR STRIP-MCNC: NORMAL MG/DL

## 2019-01-15 PROCEDURE — 59401 PR NEW OB VISIT: CPT | Performed by: NURSE PRACTITIONER

## 2019-01-15 PROCEDURE — 81002 URINALYSIS NONAUTO W/O SCOPE: CPT | Performed by: NURSE PRACTITIONER

## 2019-01-15 NOTE — PROGRESS NOTES
Pt. Here for NOB visit today.  Pt states feels some fetal movement.  # [phone]  First prenatal care  Pt. States no complaints.   Pharmacy verified.   Chaperone offered and provided.   Pt states received flu vaccine in 10/2018 at her work.    Siri arrived. Discharged home.

## 2019-01-15 NOTE — LETTER
Estudios Para Detectar los Portadores de la Fibrosis Quística   (La Enfermedad Fibroquística del Páncreas)    Yousuf Rae-Guzman    Esta información esta relacionada con un examen de giovanni que puede determinar si usted o ames fiona es portador del gen que causa la enfermedad hereditaria que se llama la fibrosis quística.     ¿QUE ES LA ENFERMEDAD FIBROSIS QUÍSTICA?  · La  fibrosis quística es prudence enfermedad hereditaria que afecta a más de 25,000 niños y jóvenes adultos americanos.  · Los síntomas de la fibrosis quística varían de prudence persona a otra.  Los síntomas más comunes son congestión pulmonar, diarrea y retraso en el crecimiento del ander.  La mayoría de las personas con la fibrosis quística padecen de problemas médicos muy severos, y algunas mueren jóvenes.  Otras tienen tan pocos síntomas que no se percatan de que tienen fibrosis quística.  · La fibrosis quística no afecta en absoluto la inteligencia de la persona.  · Aunque actualmente no hay prudence jennifer para la fibrosis quística, los científicos están avanzando con respecto a nuevos tratamientos y en la búsqueda de la jennifer.  Anteriormente la mayoría de las personas que padecían de fibrosis quística morían muy jóvenes.  Hoy en día, muchas personas que padecen de la fibrosis quística sobreviven hasta los 20 o 30 anos de edad.    ¿EXISTE LA POSIBILIDAD DE QUE MI FANI PUEDA TENER FIBROSIS QUÍSTICA?  · Usted puede tener un hijo con la fibrosis quística aun cuando no hay nadie en ames adrián que la padezca.  (Alana la grafica que sigue.)  · Existe prudence prueba que puede ayudarle a determinar si yvon un gen para la fibrosis quística y si por eso corre un riesgo de tener un hijo con la enfermedad.  · Si ambos padres son portadores del gen para la fibrosis quística, hay prudence (1) probabilidad entre 4 (25%) en cada embarazo, de que puedan tener un hijo afectada con fibrosis quística.  · Los portadores del gen para la fibrosis quística tienen prudence copia del gen  normal y el otro gen alterado.  · Las personas con fibrosis quística tienen dos genes alterados para la fibrosis quística,  · Normalmente la mayoría de individuos tienen dos copias normales del gen para fibrosis quística.    Riesgo aproximado de que prudence fiona sin familiares con fibrosis quística pueda tener un hijo con fibrosis quística:  Origen / Riesgo  Prudence fiona Caucásica (de luke tracy):  1 en 2,500  Prudence fiona :  1 en 8,000  Prudence fiona Afroamericana (de luke shin):  1 en 15,000  Prudence fiona Asiática:  1 en 32,000    ¿EXISTEN PRUEBAS PARA DETECTAR LOS PORTADORES DE LA FIBROSIS QUÍSTICA?  · Hay prudence prueba de giovanni para determinar si usted o ames fioan portan el gen para la fibrosis quística.  · Es importante entender que la prueba no detecta todos los portadores del gen para la fibrosis quística.  · Si la prueba determina que ambos padres con portadores, se puede realizar otras pruebas para determinar si ames futuro stepan esta afectado.    ¿CUANTO YING LA PRUEBA PARA DETERMINAR SI SOY PORTADOR(A) DEL GEN PARA LA FIBROSIS QUÍSTICA?  · El costo de la prueba varia según ames póliza de seguro medico y el laboratorio donde se efectúa el análisis.  · El costo promedio de la prueba es de $300.  · Ames consejera genética puede darle mas información acera de esta prueba para determinar si usted es portador de la fibrosis quística.    _____  Si, yo estoy interesada y me gustaria obtener mas información al respecto.  _____  No tengo interés ni en hacerme la prueba para determinar si soy portador(a) de gen para la fibrosis quística ni en recibir mas información al respecto.    Firma del paciente: _____________________________   1/15/2019

## 2019-01-15 NOTE — PROGRESS NOTES
Subjective:   Yousuf Miller is a 38 y.o.  who presents for her new OB exam.  She is 18w5d with an ERICKA of Estimated Date of Delivery: 19 by LMP she is sure of that comes each month. She is feeling well and has no concerns at this time. Denies VB, LOF, contractions or pain. No ER visits or previous care in this pregnancy. Denies dysuria, vaginal DC, fever. Reports fetal movement. Desires AFP.  Declines CF.  Desires PANN if something abnormal.     History reviewed. No pertinent past medical history.    Psych Hx: Patient denies any history of depression, anxiety, PTSD, bipolar or any other psychological issues.     History reviewed. No pertinent surgical history.     OB History    Para Term  AB Living   2 1 1     1   SAB TAB Ectopic Molar Multiple Live Births             1      # Outcome Date GA Lbr Tato/2nd Weight Sex Delivery Anes PTL Lv   2 Current            1 Term 18 40w5d  3.92 kg (8 lb 10.3 oz) M Vag-Spont   LJ           Gynecological Hx: Denies any hx of STIs, including HSV. Denies any vulvovaginal disorders and no hx of abnormal cervical cytology. Last pap 2017.     Sexual Hx: One current male partner, who is FOB     Contraceptive Hx: Has not used in the past and has since discontinued use.     Family History   Problem Relation Age of Onset   • No Known Problems Sister    • No Known Problems Brother    • Dementia Maternal Grandmother      Denies any genetic disorders in family history.     Social History     Social History   • Marital status:      Spouse name: N/A   • Number of children: N/A   • Years of education: N/A     Occupational History   • Not on file.     Social History Main Topics   • Smoking status: Never Smoker   • Smokeless tobacco: Never Used   • Alcohol use No   • Drug use: No   • Sexual activity: Yes     Partners: Male      Comment: none     Other Topics Concern   • Not on file     Social History Narrative   • No narrative on file       FOB is  "inbvolved  and lives with Yousuf RaeMellyGuzman.  Pregnancy is planned.  She is currently working at [employer], denies any heavy lifting or exposure to potential teratogens like environmental or occupational toxins.   Denies alcohol use, drug use, or tobacco use in pregnancy.   Denies any current or hx of sexual, emotional or physical abuse or trauma.     Current Medications: PNV, vitamin D  Allergies: Denies allergies to medications, food, or environmental allergies    Objective:      Vitals:    01/15/19 0947   BP: 118/74   Weight: 105.2 kg (232 lb)   Height: 1.626 m (5' 4\")        See Prenatal Physical and Prenatal Vitals  UA WNL today      Assessment:      1.  IUP @ 18w5d per LMP      2.  S=D      3.  See problem list as follows       Patient Active Problem List    Diagnosis Date Noted   • AMA (advanced maternal age) multigravida 35+ 01/10/2019         Plan:   -  GC/CT & pap done today   - AFP ordered   - Prenatal labs ordered - lab slip given  - Discussed PNV, nutrition, adequate water intake, and exercise/weight gain in pregnancy  - NOB informational packet with anticipatory guidance given  - Information on Centering Pregnancy given, pt declines  - S/sx of pregnancy warning signs and PTL precautions given  - Complete OB US in 1-2 wks  - Return to [facility] in 4 wks  "

## 2019-01-15 NOTE — PROGRESS NOTES
Pt. Here for NOB visit today.  #  First prenatal care  Pt. states  Pharmacy verified  AFP?  CF?  Flu?  Chaperone offered and present

## 2019-01-16 LAB
C TRACH DNA SPEC QL NAA+PROBE: NEGATIVE
N GONORRHOEA DNA SPEC QL NAA+PROBE: NEGATIVE
SPECIMEN SOURCE: NORMAL

## 2019-01-18 ENCOUNTER — HOSPITAL ENCOUNTER (OUTPATIENT)
Dept: LAB | Facility: MEDICAL CENTER | Age: 39
End: 2019-01-18
Attending: NURSE PRACTITIONER
Payer: COMMERCIAL

## 2019-01-18 DIAGNOSIS — O09.529 ANTEPARTUM MULTIGRAVIDA OF ADVANCED MATERNAL AGE: ICD-10-CM

## 2019-01-18 LAB
ABO GROUP BLD: NORMAL
APPEARANCE UR: CLEAR
BASOPHILS # BLD AUTO: 0.2 % (ref 0–1.8)
BASOPHILS # BLD: 0.02 K/UL (ref 0–0.12)
BILIRUB UR QL STRIP.AUTO: NEGATIVE
BLD GP AB SCN SERPL QL: NORMAL
COLOR UR: YELLOW
EOSINOPHIL # BLD AUTO: 0.06 K/UL (ref 0–0.51)
EOSINOPHIL NFR BLD: 0.7 % (ref 0–6.9)
ERYTHROCYTE [DISTWIDTH] IN BLOOD BY AUTOMATED COUNT: 46.4 FL (ref 35.9–50)
GLUCOSE UR STRIP.AUTO-MCNC: NEGATIVE MG/DL
HBV SURFACE AG SER QL: NEGATIVE
HCT VFR BLD AUTO: 38 % (ref 37–47)
HGB BLD-MCNC: 13 G/DL (ref 12–16)
HIV 1+2 AB+HIV1 P24 AG SERPL QL IA: NON REACTIVE
IMM GRANULOCYTES # BLD AUTO: 0.05 K/UL (ref 0–0.11)
IMM GRANULOCYTES NFR BLD AUTO: 0.6 % (ref 0–0.9)
KETONES UR STRIP.AUTO-MCNC: ABNORMAL MG/DL
LEUKOCYTE ESTERASE UR QL STRIP.AUTO: NEGATIVE
LYMPHOCYTES # BLD AUTO: 1.42 K/UL (ref 1–4.8)
LYMPHOCYTES NFR BLD: 16 % (ref 22–41)
MCH RBC QN AUTO: 33.9 PG (ref 27–33)
MCHC RBC AUTO-ENTMCNC: 34.2 G/DL (ref 33.6–35)
MCV RBC AUTO: 99.2 FL (ref 81.4–97.8)
MICRO URNS: ABNORMAL
MONOCYTES # BLD AUTO: 0.5 K/UL (ref 0–0.85)
MONOCYTES NFR BLD AUTO: 5.6 % (ref 0–13.4)
NEUTROPHILS # BLD AUTO: 6.84 K/UL (ref 2–7.15)
NEUTROPHILS NFR BLD: 76.9 % (ref 44–72)
NITRITE UR QL STRIP.AUTO: NEGATIVE
NRBC # BLD AUTO: 0 K/UL
NRBC BLD-RTO: 0 /100 WBC
PH UR STRIP.AUTO: 5.5 [PH]
PLATELET # BLD AUTO: 244 K/UL (ref 164–446)
PMV BLD AUTO: 10.9 FL (ref 9–12.9)
PROT UR QL STRIP: NEGATIVE MG/DL
RBC # BLD AUTO: 3.83 M/UL (ref 4.2–5.4)
RBC UR QL AUTO: NEGATIVE
RH BLD: NORMAL
RUBV AB SER QL: 203.7 IU/ML
SP GR UR STRIP.AUTO: 1.02
TREPONEMA PALLIDUM IGG+IGM AB [PRESENCE] IN SERUM OR PLASMA BY IMMUNOASSAY: NON REACTIVE
UROBILINOGEN UR STRIP.AUTO-MCNC: 0.2 MG/DL
WBC # BLD AUTO: 8.9 K/UL (ref 4.8–10.8)

## 2019-01-25 ENCOUNTER — TELEPHONE (OUTPATIENT)
Dept: OBGYN | Facility: CLINIC | Age: 39
End: 2019-01-25

## 2019-01-25 LAB
# FETUSES US: NORMAL
AFP MOM SERPL: 0.84
AFP SERPL-MCNC: 31 NG/ML
AGE - REPORTED: 37.7 YR
CURRENT SMOKER: NO
FAMILY MEMBER DISEASES HX: NO
GA METHOD: NORMAL
GA: NORMAL WK
HCG MOM SERPL: 1.32
HCG SERPL-ACNC: NORMAL IU/L
HX OF HEREDITARY DISORDERS: NO
IDDM PATIENT QL: NO
INHIBIN A MOM SERPL: 1.36
INHIBIN A SERPL-MCNC: 187 PG/ML
INTEGRATED SCN PATIENT-IMP: NORMAL
PATHOLOGY STUDY: NORMAL
SPECIMEN DRAWN SERPL: NORMAL
U ESTRIOL MOM SERPL: 0.92
U ESTRIOL SERPL-MCNC: 1.42 NG/ML

## 2019-01-25 NOTE — TELEPHONE ENCOUNTER
Received call from Harper requesting ERICKA and method. Notified Harper of ERICKA and it is based on her LMP. She had no other questions

## 2019-01-31 ENCOUNTER — DATING (OUTPATIENT)
Dept: OBGYN | Facility: CLINIC | Age: 39
End: 2019-01-31

## 2019-01-31 ENCOUNTER — APPOINTMENT (OUTPATIENT)
Dept: RADIOLOGY | Facility: IMAGING CENTER | Age: 39
End: 2019-01-31
Attending: NURSE PRACTITIONER
Payer: MEDICAID

## 2019-01-31 DIAGNOSIS — O99.212 OBESITY COMPLICATING PREGNANCY, SECOND TRIMESTER: ICD-10-CM

## 2019-01-31 DIAGNOSIS — O09.529 ANTEPARTUM MULTIGRAVIDA OF ADVANCED MATERNAL AGE: ICD-10-CM

## 2019-01-31 PROCEDURE — 99999 US-OB 2ND 3RD TRI COMPLETE: CPT | Mod: 26 | Performed by: NURSE PRACTITIONER

## 2019-01-31 PROCEDURE — 76805 OB US >/= 14 WKS SNGL FETUS: CPT | Performed by: OBSTETRICS & GYNECOLOGY

## 2019-02-04 ENCOUNTER — TELEPHONE (OUTPATIENT)
Dept: OBGYN | Facility: CLINIC | Age: 39
End: 2019-02-04

## 2019-02-04 NOTE — TELEPHONE ENCOUNTER
----- Message from Lidia Bateman M.D. sent at 1/31/2019  3:32 PM PST -----  Please inform patient u/s normal, due date changed to 6/1/19      Called patient, no answer. Unable to leave a VM, voicemail was full.

## 2019-02-14 ENCOUNTER — ROUTINE PRENATAL (OUTPATIENT)
Dept: OBGYN | Facility: CLINIC | Age: 39
End: 2019-02-14
Payer: MEDICAID

## 2019-02-14 VITALS — SYSTOLIC BLOOD PRESSURE: 110 MMHG | DIASTOLIC BLOOD PRESSURE: 70 MMHG | BODY MASS INDEX: 40.68 KG/M2 | WEIGHT: 237 LBS

## 2019-02-14 DIAGNOSIS — Z34.82 ENCOUNTER FOR SUPERVISION OF OTHER NORMAL PREGNANCY, SECOND TRIMESTER: Primary | ICD-10-CM

## 2019-02-14 PROCEDURE — 90040 PR PRENATAL FOLLOW UP: CPT | Performed by: NURSE PRACTITIONER

## 2019-02-14 NOTE — PROGRESS NOTES
OB f/u. + fetal movement.  No VB, LOF or UC's.  Wt: 237lb      BP:110/70  Good phone #[phone]   Preferred pharmacy confirmed.  1 Hr GTT lab slip given to Pt along with instructions.

## 2019-02-14 NOTE — PROGRESS NOTES
S) Pt is a 38 y.o.   at 24w5d  gestation. Routine prenatal care today. Pt without c/o today.    Fetal movement Normal  Cramping no  VB no  LOF no   Denies dysuria. Generally feels well today. Good self-care activities identified. Denies headaches, swelling, visual changes, or epigastric pain .     O) Blood pressure 110/70, weight 107.5 kg (237 lb), last menstrual period 2018, unknown if currently breastfeeding.        Labs:       PNL: WNL       GCT:       AFP: normal       GBS: N/A       Pertinent ultrasound - 19 CAITLIN 16.59, survey WNL, ERICKA adjusted accordingly           A) IUP at 24w5d       S=D         Patient Active Problem List    Diagnosis Date Noted   • Obesity complicating pregnancy, second trimester 2019   • Close pregnancy spacing- last  2018 01/15/2019   • AMA (advanced maternal age) multigravida 35+ 01/10/2019          SVE: deferred         TDAP: no       FLU: no        BTL: no       : no       C/S Consent: no       IOL or C/S scheduled: no       LAST PAP: 2017 negative         P) s/s ptl vs general discomforts. Fetal movements reviewed. General ed and anticipatory guidance. Nutrition/exercise/vitamin. Plans breast Plans pp contraception- unsure    Discussed adjustment of ERICKA.   RTC 3-4 weeks or PRN

## 2019-02-22 ENCOUNTER — HOSPITAL ENCOUNTER (OUTPATIENT)
Dept: LAB | Facility: MEDICAL CENTER | Age: 39
End: 2019-02-22
Attending: NURSE PRACTITIONER
Payer: MEDICAID

## 2019-02-22 DIAGNOSIS — Z34.82 ENCOUNTER FOR SUPERVISION OF OTHER NORMAL PREGNANCY, SECOND TRIMESTER: ICD-10-CM

## 2019-02-22 LAB
HCT VFR BLD AUTO: 38.5 % (ref 37–47)
HGB BLD-MCNC: 12.8 G/DL (ref 12–16)

## 2019-02-22 PROCEDURE — 86780 TREPONEMA PALLIDUM: CPT

## 2019-02-22 PROCEDURE — 36415 COLL VENOUS BLD VENIPUNCTURE: CPT

## 2019-02-22 PROCEDURE — 82950 GLUCOSE TEST: CPT

## 2019-02-22 PROCEDURE — 85018 HEMOGLOBIN: CPT

## 2019-02-22 PROCEDURE — 85014 HEMATOCRIT: CPT

## 2019-02-23 LAB
GLUCOSE 1H P 50 G GLC PO SERPL-MCNC: 141 MG/DL (ref 70–139)
TREPONEMA PALLIDUM IGG+IGM AB [PRESENCE] IN SERUM OR PLASMA BY IMMUNOASSAY: NON REACTIVE

## 2019-02-25 DIAGNOSIS — R73.09 ELEVATED GLUCOSE: Primary | ICD-10-CM

## 2019-03-07 ENCOUNTER — ROUTINE PRENATAL (OUTPATIENT)
Dept: OBGYN | Facility: CLINIC | Age: 39
End: 2019-03-07
Payer: MEDICAID

## 2019-03-07 ENCOUNTER — TELEPHONE (OUTPATIENT)
Dept: OBGYN | Facility: MEDICAL CENTER | Age: 39
End: 2019-03-07

## 2019-03-07 VITALS — BODY MASS INDEX: 40.85 KG/M2 | WEIGHT: 238 LBS | DIASTOLIC BLOOD PRESSURE: 74 MMHG | SYSTOLIC BLOOD PRESSURE: 120 MMHG

## 2019-03-07 DIAGNOSIS — O09.523 ELDERLY MULTIGRAVIDA IN THIRD TRIMESTER: ICD-10-CM

## 2019-03-07 PROCEDURE — 90715 TDAP VACCINE 7 YRS/> IM: CPT | Performed by: PHYSICIAN ASSISTANT

## 2019-03-07 PROCEDURE — 90471 IMMUNIZATION ADMIN: CPT | Performed by: PHYSICIAN ASSISTANT

## 2019-03-07 PROCEDURE — 90040 PR PRENATAL FOLLOW UP: CPT | Performed by: PHYSICIAN ASSISTANT

## 2019-03-07 NOTE — PROGRESS NOTES
Pt. Here for OB/F/U, Kick Count sheet given and explained to pt.   Good FM  Good # [phone]  Pt. Denies VB, LOF, or UC's.   Pharmacy verified.   Pt states will do 3 HR GTT before her next visit.   Tdap vaccine given today, right deltoid. Screening checklist reviewed with pt verified by Maxwell GREGORY   BTL declined.

## 2019-03-07 NOTE — TELEPHONE ENCOUNTER
----- Message from Nely Hedrick C.N.M. sent at 2/25/2019  7:51 AM PST -----  Please call patient with results, have her do 3 hour ASAP

## 2019-03-07 NOTE — LETTER
Cuente los Movimientos de ames Bebé  Otro paso importante para la marguerite de ames bebé    [facility]            Dept: [phone]    ¿Cuántas semanas tiene de embarazo? 27w5d    Fecha cuando tiene que comenzar a contar el movimiento: 3/7/19                  [place] debe usar steven diagrama    Prudence manera en que ames doctor puede controlar a marguerite de ames bebé es sabiendo cuantas veces se mueve ames bebé en el útero, o por medio de las “pataditas”.  Usted podrá ayudarle a ames médico al usar cada día el siguiente diagrama.    Cada día, usted debe prestar atención a cuantas horas le lleva a ames bebé moverse 10 veces.  Comience a contar en la mañana, lo antes posible después de haberse levantado.    · Primeramente, escriba la hora en que se mueve ames bebé, hasta llegar a 10 veces.  · Colóquele un check o palomita a cada cuadrito cada vez que ames bebé se mueva hasta que complete 10 veces.  · Escriba la hora cuando termine de contar 10 veces en la última columna.  · Sume el total del tiempo que le llevó contar los 10 movimientos.  · Finalmente, complete el cuadrito de cuantas horas le llevó hacerlo.    Después de pascale contado los 10 movimientos, ya no tendrá que contar los demás movimientos por el amanda del día.  A la mañana siguiente, comience a contar de nuevo cuantas veces se mueve el bebé desde el momento en que se levante.    ¿Qué tendría que considerarse un “movimiento”?  Es difícil de decirlo porque es distinto de prudence madre a otra, y de un embarazo a otro.  Lo importante es que cuente el movimiento de la misma manera tarun el transcurso de ames embarazo.  Si tiene preguntas adicionales, pregúntele a ames doctor.    ¡Cuente cuidadosamente cada día!     MUESTRA:  Semana 28    ¿Cuántas horas le ha llevado sentir 10 movimientos?        Hora de Inicio     1     2     3     4     5     6     7     8     9     10   Hora de Finlizar   Hoda. 8:20 ·  ·  ·  ·  ·  ·  ·  ·  ·  ·  11:40   Mar.               Mié.                Jue.               Vie.               Sáb.               Dom.                 IMPORTANTE:  Usted debe contactar a ames doctor si le lleva más de 2 horas sentir 10 movimientos de ames bebé.    Cada mañana, escriba la hora de inicio y comience a contar los movimientos de ames bebé.  Hágalo colocándole un check o palomita a cada cuadrito cada vez que sienta un movimiento de ames bebé.  Cuando haya sentido 10 “pataditas”, escriba la hora en que terminó de contar en la última columna.  Luego, complete en la cajita (arriba de la haley de check o palomita) el número total de horas que le llevó hacerlo.  Asegúrese de leer completamente las instrucciones en la página anterior.

## 2019-03-07 NOTE — PROGRESS NOTES
Pt has no complaints with cramping, bleeding or pain. +FM - FKC sheet given today. 1hr , but H/H, RPR wnl - pt notified of results and need for 3hr GTT. Pt informed to be fasting before 3hr GTT. TdaP given today. Declines BTL.  RTC 2 wk or sooner prn.

## 2019-03-14 ENCOUNTER — HOSPITAL ENCOUNTER (OUTPATIENT)
Dept: LAB | Facility: MEDICAL CENTER | Age: 39
End: 2019-03-14
Attending: NURSE PRACTITIONER
Payer: MEDICAID

## 2019-03-14 DIAGNOSIS — R73.09 ELEVATED GLUCOSE: ICD-10-CM

## 2019-03-14 LAB
GLUCOSE 1H P CHAL SERPL-MCNC: 168 MG/DL (ref 65–180)
GLUCOSE 2H P CHAL SERPL-MCNC: 131 MG/DL (ref 65–155)
GLUCOSE 3H P CHAL SERPL-MCNC: 134 MG/DL (ref 65–140)
GLUCOSE BS SERPL-MCNC: 92 MG/DL (ref 65–95)

## 2019-03-14 PROCEDURE — 82952 GTT-ADDED SAMPLES: CPT

## 2019-03-14 PROCEDURE — 82951 GLUCOSE TOLERANCE TEST (GTT): CPT

## 2019-03-14 PROCEDURE — 36415 COLL VENOUS BLD VENIPUNCTURE: CPT

## 2019-03-21 ENCOUNTER — ROUTINE PRENATAL (OUTPATIENT)
Dept: OBGYN | Facility: CLINIC | Age: 39
End: 2019-03-21
Payer: MEDICAID

## 2019-03-21 VITALS — DIASTOLIC BLOOD PRESSURE: 66 MMHG | BODY MASS INDEX: 41.2 KG/M2 | WEIGHT: 240 LBS | SYSTOLIC BLOOD PRESSURE: 108 MMHG

## 2019-03-21 DIAGNOSIS — Z34.83 ENCOUNTER FOR SUPERVISION OF OTHER NORMAL PREGNANCY IN THIRD TRIMESTER: ICD-10-CM

## 2019-03-21 PROCEDURE — 90040 PR PRENATAL FOLLOW UP: CPT | Performed by: NURSE PRACTITIONER

## 2019-03-21 NOTE — PROGRESS NOTES
Pt here today for OB follow up  Pt states would like to go over her labs  Reports +FM  Pharmacy Confirmed.  Chaperone offered and declined.

## 2019-03-21 NOTE — PROGRESS NOTES
S:  Pt is  at 29w5d for routine OB follow up.  No concerns today.  Reports good FM.  Denies VB, LOF, RUCs or vaginal DC.    O:  Please see above vitals.        FHTs: 135        Fundal ht: 30 cm.        S=D        3hr GTT: wnl -- reviewed w pt.    A:  IUP at 29w5d  Patient Active Problem List    Diagnosis Date Noted   • Elevated 1hr  --> 3hr GTT wnl 2019   • Obesity complicating pregnancy, second trimester 2019   • Close pregnancy spacing- last  2018 01/15/2019   • AMA (advanced maternal age) multigravida 35+ 01/10/2019        P:  1.  PP contraception: unsure.        2.  Continue FKCs.          3.  Questions answered.          4.  Encouraged pt to tour L&D.          5.  Encourage adequate water intake.        6.  F/u 2 wks.

## 2019-04-04 ENCOUNTER — ROUTINE PRENATAL (OUTPATIENT)
Dept: OBGYN | Facility: CLINIC | Age: 39
End: 2019-04-04
Payer: MEDICAID

## 2019-04-04 VITALS — BODY MASS INDEX: 41.2 KG/M2 | SYSTOLIC BLOOD PRESSURE: 118 MMHG | WEIGHT: 240 LBS | DIASTOLIC BLOOD PRESSURE: 72 MMHG

## 2019-04-04 DIAGNOSIS — Z34.83 ENCOUNTER FOR SUPERVISION OF OTHER NORMAL PREGNANCY, THIRD TRIMESTER: Primary | ICD-10-CM

## 2019-04-04 PROCEDURE — 90040 PR PRENATAL FOLLOW UP: CPT | Performed by: NURSE PRACTITIONER

## 2019-04-04 NOTE — PROGRESS NOTES
OB F/U  Denies VB, LOF, or UC  +FM  Phone#:  [phone]  Pharmacy Confirmed.  C/O: None   3hr WNL

## 2019-04-18 ENCOUNTER — ROUTINE PRENATAL (OUTPATIENT)
Dept: OBGYN | Facility: CLINIC | Age: 39
End: 2019-04-18
Payer: MEDICAID

## 2019-04-18 VITALS — DIASTOLIC BLOOD PRESSURE: 70 MMHG | SYSTOLIC BLOOD PRESSURE: 110 MMHG | BODY MASS INDEX: 41.71 KG/M2 | WEIGHT: 243 LBS

## 2019-04-18 DIAGNOSIS — Z34.83 ENCOUNTER FOR SUPERVISION OF OTHER NORMAL PREGNANCY, THIRD TRIMESTER: ICD-10-CM

## 2019-04-18 PROCEDURE — 90040 PR PRENATAL FOLLOW UP: CPT | Performed by: ADVANCED PRACTICE MIDWIFE

## 2019-04-18 NOTE — PROGRESS NOTES
Patient here for PATIENCE visit at 33w5d of pregnancy. She affirms  fetal movement, denies vaginal bleeding or cramping/regular contractions. She reports overall today she is feeling well and without complaints. No recent ER visits since last seen. Patient concerned about size of baby as last child was 8# 9 oz. We discussed her previous 2nd degree laceration. S=D and reassuring. Adequate hydration reviewed in detail with patient. Precautions and warning signs reviewed with patient. RTC in 2 week(s) for PATIENCE visit.

## 2019-04-18 NOTE — PROGRESS NOTES
Pt here today for OB follow up  Pt states having some abdominal pressure   Reports +FM  Good # [phone]   Pharmacy Confirmed.  Chaperone offered and not indicated.

## 2019-05-02 ENCOUNTER — ROUTINE PRENATAL (OUTPATIENT)
Dept: OBGYN | Facility: CLINIC | Age: 39
End: 2019-05-02
Payer: MEDICAID

## 2019-05-02 ENCOUNTER — HOSPITAL ENCOUNTER (OUTPATIENT)
Facility: MEDICAL CENTER | Age: 39
End: 2019-05-02
Attending: NURSE PRACTITIONER
Payer: MEDICAID

## 2019-05-02 VITALS — SYSTOLIC BLOOD PRESSURE: 104 MMHG | DIASTOLIC BLOOD PRESSURE: 72 MMHG | WEIGHT: 244 LBS | BODY MASS INDEX: 41.88 KG/M2

## 2019-05-02 DIAGNOSIS — O09.893 SUPERVISION OF OTHER HIGH RISK PREGNANCIES, THIRD TRIMESTER: ICD-10-CM

## 2019-05-02 DIAGNOSIS — O09.893 SUPERVISION OF OTHER HIGH RISK PREGNANCIES, THIRD TRIMESTER: Primary | ICD-10-CM

## 2019-05-02 PROCEDURE — 90040 PR PRENATAL FOLLOW UP: CPT | Performed by: NURSE PRACTITIONER

## 2019-05-02 PROCEDURE — 87150 DNA/RNA AMPLIFIED PROBE: CPT

## 2019-05-02 PROCEDURE — 87081 CULTURE SCREEN ONLY: CPT

## 2019-05-02 NOTE — PROGRESS NOTES
S) Pt is a 38 y.o.   at 35w5d  gestation. Routine prenatal care today. Pt feeling like hips starting to open up and baby dropping.    Fetal movement Normal  Cramping no  VB no  LOF no   Denies dysuria. Generally feels well today. Good self-care activities identified. Denies headaches, swelling, visual changes, or epigastric pain .     O) /72   Wt 110.7 kg (244 lb)         Labs: WNL       PNL: WNL       GCT: elevated 1 hr, normal 3 hr       AFP: normal       GBS: collected today       Pertinent ultrasound -        19 CAITLIN 16.59 survey WNL, ERICKA adjusted    A) IUP at 35w5d       S=D         Patient Active Problem List    Diagnosis Date Noted   • Elevated 1hr  --> 3hr GTT wnl 2019   • Obesity complicating pregnancy, second trimester 2019   • Close pregnancy spacing- last  2018 01/15/2019   • AMA (advanced maternal age) multigravida 35+ 01/10/2019          SVE: deferred         TDAP: yes       FLU: no        BTL: no       : no       C/S Consent: no       IOL or C/S scheduled: no       LAST PAP: 17 negative         P) s/s ptl vs general discomforts. Fetal movements reviewed. General ed and anticipatory guidance. Nutrition/exercise/vitamin. Plans breast Plans pp contraception- unsure    GBS collected.  Discussed plan for IOL 40-41 weeks.  RTC 1 week or PRN

## 2019-05-02 NOTE — PROGRESS NOTES
OB F/U  Denies VB, LOF, or UC  +FM  Phone#: [phone]  Pharmacy Confirmed.  C/O: None   3hr WNL  GBS today

## 2019-05-04 LAB — GP B STREP DNA SPEC QL NAA+PROBE: NEGATIVE

## 2019-05-09 ENCOUNTER — ROUTINE PRENATAL (OUTPATIENT)
Dept: OBGYN | Facility: CLINIC | Age: 39
End: 2019-05-09
Payer: MEDICAID

## 2019-05-09 VITALS — SYSTOLIC BLOOD PRESSURE: 112 MMHG | BODY MASS INDEX: 42.4 KG/M2 | DIASTOLIC BLOOD PRESSURE: 68 MMHG | WEIGHT: 247 LBS

## 2019-05-09 DIAGNOSIS — O09.893 SUPERVISION OF OTHER HIGH RISK PREGNANCIES, THIRD TRIMESTER: Primary | ICD-10-CM

## 2019-05-09 PROCEDURE — 90040 PR PRENATAL FOLLOW UP: CPT | Performed by: NURSE PRACTITIONER

## 2019-05-09 NOTE — PROGRESS NOTES
Pt. Here for OB/FU. Reports Good FM.   Good # [phone]  Pt states having hip pain when walking.   Pharmacy verified.   GBS negative

## 2019-05-09 NOTE — PROGRESS NOTES
S) Pt is a 38 y.o.   at 36w5d  gestation. Routine prenatal care today. Pt having some right sided sciatic pain.    Fetal movement Normal  Cramping no  VB no  LOF no   Denies dysuria. Generally feels well today. Good self-care activities identified. Denies headaches, swelling, visual changes, or epigastric pain .     O) /68   Wt 112 kg (247 lb)         Labs: WNL       PNL: WNL       GCT: elevated 1 hr, normal 3 hr       AFP: normal       GBS: negative       Pertinent ultrasound - 19 CAITLIN 16.59 survey WNL, ERICKA adjusted           A) IUP at 36w5d       S=D         Patient Active Problem List    Diagnosis Date Noted   • Elevated 1hr  --> 3hr GTT wnl 2019   • Obesity complicating pregnancy, second trimester 2019   • Close pregnancy spacing- last  2018 01/15/2019   • AMA (advanced maternal age) multigravida 35+ 01/10/2019          SVE: deferred         TDAP: yes       FLU: no        BTL: no       : no       C/S Consent: no       IOL or C/S scheduled: no       LAST PAP: 17 negative         P) s/s ptl vs general discomforts. Fetal movements reviewed. General ed and anticipatory guidance. Nutrition/exercise/vitamin. Plans breast Plans pp contraception- unsure    Discussed some stretches, heat, ice, tylenol for sciatic area.  Discussed GBS neg result  RTC 1 week or PRN

## 2019-05-16 ENCOUNTER — ROUTINE PRENATAL (OUTPATIENT)
Dept: OBGYN | Facility: CLINIC | Age: 39
End: 2019-05-16
Payer: MEDICAID

## 2019-05-16 VITALS — WEIGHT: 246 LBS | BODY MASS INDEX: 42.23 KG/M2 | SYSTOLIC BLOOD PRESSURE: 120 MMHG | DIASTOLIC BLOOD PRESSURE: 76 MMHG

## 2019-05-16 DIAGNOSIS — Z34.83 ENCOUNTER FOR SUPERVISION OF OTHER NORMAL PREGNANCY IN THIRD TRIMESTER: ICD-10-CM

## 2019-05-16 PROCEDURE — 90040 PR PRENATAL FOLLOW UP: CPT | Performed by: NURSE PRACTITIONER

## 2019-05-16 NOTE — PROGRESS NOTES
S:  Pt is  at 37w5d here for routine OB follow up.  No concerns today.  Reports good FM.  Denies VB, LOF, RUCs, or vaginal DC.     O:  Please see above vitals.        FHTs: 135        Fundal ht: 38 cm        Fetal position: vertex        SVE: cl/th/posterior        GBS neg -- reviewed w pt.      A:  IUP at 37w5d  Patient Active Problem List    Diagnosis Date Noted   • Elevated 1hr  --> 3hr GTT wnl 2019   • Obesity complicating pregnancy, second trimester 2019   • Close pregnancy spacing- last  2018 01/15/2019   • AMA (advanced maternal age) multigravida 35+ 01/10/2019       P:  1.  Continue FKCs.         2.  Labor precautions given.  Instructions given on where to go.  Pt receptive to education.         3.  D/w pt plan for VE and referral for IOL at 38 wk visit.        4.  Questions answered.         5.  Encouraged adequate water intake       6.  F/u 1wk

## 2019-05-16 NOTE — PROGRESS NOTES
Ob follow up. Good fetal movement. Pt has no complaints.   Phone # & pharmacy verified.  GBS= Negative

## 2019-05-23 ENCOUNTER — ROUTINE PRENATAL (OUTPATIENT)
Dept: OBGYN | Facility: CLINIC | Age: 39
End: 2019-05-23
Payer: MEDICAID

## 2019-05-23 VITALS — BODY MASS INDEX: 42.74 KG/M2 | DIASTOLIC BLOOD PRESSURE: 80 MMHG | WEIGHT: 249 LBS | SYSTOLIC BLOOD PRESSURE: 122 MMHG

## 2019-05-23 DIAGNOSIS — Z91.89 BREASTFEEDING PROBLEM: ICD-10-CM

## 2019-05-23 DIAGNOSIS — Z3A.38 38 WEEKS GESTATION OF PREGNANCY: ICD-10-CM

## 2019-05-23 PROCEDURE — 90040 PR PRENATAL FOLLOW UP: CPT | Performed by: NURSE PRACTITIONER

## 2019-05-23 NOTE — PROGRESS NOTES
SUBJECTIVE:  Pt is a 38 y.o.   at 38w5d  gestation. Presents today for follow-up prenatal care. Reports no issues at this time.  Reports good fetal movement. Denies cramping/contractions, bleeding or leaking of fluid. Denies dysuria, headaches, N/V, or other issues at this time. Generally feels well today.     OBJECTIVE:  - See prenatal vitals flow  -   Vitals:    19 1336   BP: 122/80   Weight: 112.9 kg (249 lb)                 ASSESSMENT:   - IUP at 38w5d    - S=D   -   Patient Active Problem List    Diagnosis Date Noted   • Close pregnancy spacing- last  2018 01/15/2019   • AMA (advanced maternal age) multigravida 35+ 01/10/2019         PLAN:  - S/sx pregnancy and labor warning signs vs general discomforts discussed  - Fetal movements and/or kick counts reviewed   - Adequate hydration reinforced  - Nutrition/exercise/vitamin education; continue PNV  - Plans for breastfeeding:handout given and reviewed   - Plans maybe vasectomy for contraception Pp: handout given and reviewed and information on health department program provided   - Preferred to defer another check until next week as not much change in status from last week   - Nightly walks   - S/p TDAP vacc   - Anticipatory guidance given  - RTC in 1 weeks for follow-up prenatal care  - IOL placed

## 2019-05-23 NOTE — PROGRESS NOTES
Pt here today for OB follow up  Reports +FM  WT: 249 lb  BP: 122/80  Pt states no complaints or concerns today  Preferred pharmacy verified with patient  Good # [phone]

## 2019-05-28 ENCOUNTER — HOSPITAL ENCOUNTER (INPATIENT)
Facility: MEDICAL CENTER | Age: 39
LOS: 1 days | End: 2019-05-29
Attending: OBSTETRICS & GYNECOLOGY | Admitting: OBSTETRICS & GYNECOLOGY
Payer: MEDICAID

## 2019-05-28 ENCOUNTER — ANESTHESIA EVENT (OUTPATIENT)
Dept: OBGYN | Facility: MEDICAL CENTER | Age: 39
End: 2019-05-28
Payer: MEDICAID

## 2019-05-28 ENCOUNTER — ANESTHESIA (OUTPATIENT)
Dept: OBGYN | Facility: MEDICAL CENTER | Age: 39
End: 2019-05-28
Payer: MEDICAID

## 2019-05-28 LAB
BASOPHILS # BLD AUTO: 0.1 % (ref 0–1.8)
BASOPHILS # BLD: 0.01 K/UL (ref 0–0.12)
EOSINOPHIL # BLD AUTO: 0.03 K/UL (ref 0–0.51)
EOSINOPHIL NFR BLD: 0.4 % (ref 0–6.9)
ERYTHROCYTE [DISTWIDTH] IN BLOOD BY AUTOMATED COUNT: 47.4 FL (ref 35.9–50)
ERYTHROCYTE [DISTWIDTH] IN BLOOD BY AUTOMATED COUNT: 48 FL (ref 35.9–50)
HCT VFR BLD AUTO: 38.6 % (ref 37–47)
HCT VFR BLD AUTO: 40.9 % (ref 37–47)
HGB BLD-MCNC: 13.1 G/DL (ref 12–16)
HGB BLD-MCNC: 13.7 G/DL (ref 12–16)
HOLDING TUBE BB 8507: NORMAL
IMM GRANULOCYTES # BLD AUTO: 0.02 K/UL (ref 0–0.11)
IMM GRANULOCYTES NFR BLD AUTO: 0.3 % (ref 0–0.9)
LYMPHOCYTES # BLD AUTO: 1.45 K/UL (ref 1–4.8)
LYMPHOCYTES NFR BLD: 19 % (ref 22–41)
MCH RBC QN AUTO: 33.9 PG (ref 27–33)
MCH RBC QN AUTO: 34.7 PG (ref 27–33)
MCHC RBC AUTO-ENTMCNC: 33.5 G/DL (ref 33.6–35)
MCHC RBC AUTO-ENTMCNC: 33.9 G/DL (ref 33.6–35)
MCV RBC AUTO: 101.2 FL (ref 81.4–97.8)
MCV RBC AUTO: 102.1 FL (ref 81.4–97.8)
MONOCYTES # BLD AUTO: 0.55 K/UL (ref 0–0.85)
MONOCYTES NFR BLD AUTO: 7.2 % (ref 0–13.4)
NEUTROPHILS # BLD AUTO: 5.58 K/UL (ref 2–7.15)
NEUTROPHILS NFR BLD: 73 % (ref 44–72)
NRBC # BLD AUTO: 0 K/UL
NRBC BLD-RTO: 0 /100 WBC
PLATELET # BLD AUTO: 177 K/UL (ref 164–446)
PLATELET # BLD AUTO: 199 K/UL (ref 164–446)
PMV BLD AUTO: 11 FL (ref 9–12.9)
PMV BLD AUTO: 11 FL (ref 9–12.9)
RBC # BLD AUTO: 3.78 M/UL (ref 4.2–5.4)
RBC # BLD AUTO: 4.04 M/UL (ref 4.2–5.4)
WBC # BLD AUTO: 7.6 K/UL (ref 4.8–10.8)
WBC # BLD AUTO: 9.4 K/UL (ref 4.8–10.8)

## 2019-05-28 PROCEDURE — 700101 HCHG RX REV CODE 250: Performed by: ANESTHESIOLOGY

## 2019-05-28 PROCEDURE — 700111 HCHG RX REV CODE 636 W/ 250 OVERRIDE (IP)

## 2019-05-28 PROCEDURE — 303615 HCHG EPIDURAL/SPINAL ANESTHESIA FOR LABOR

## 2019-05-28 PROCEDURE — A9270 NON-COVERED ITEM OR SERVICE: HCPCS | Performed by: STUDENT IN AN ORGANIZED HEALTH CARE EDUCATION/TRAINING PROGRAM

## 2019-05-28 PROCEDURE — 700111 HCHG RX REV CODE 636 W/ 250 OVERRIDE (IP): Performed by: STUDENT IN AN ORGANIZED HEALTH CARE EDUCATION/TRAINING PROGRAM

## 2019-05-28 PROCEDURE — 10907ZC DRAINAGE OF AMNIOTIC FLUID, THERAPEUTIC FROM PRODUCTS OF CONCEPTION, VIA NATURAL OR ARTIFICIAL OPENING: ICD-10-PCS | Performed by: OBSTETRICS & GYNECOLOGY

## 2019-05-28 PROCEDURE — 59409 OBSTETRICAL CARE: CPT

## 2019-05-28 PROCEDURE — 85025 COMPLETE CBC W/AUTO DIFF WBC: CPT

## 2019-05-28 PROCEDURE — 0KQM0ZZ REPAIR PERINEUM MUSCLE, OPEN APPROACH: ICD-10-PCS | Performed by: OBSTETRICS & GYNECOLOGY

## 2019-05-28 PROCEDURE — 700112 HCHG RX REV CODE 229: Performed by: STUDENT IN AN ORGANIZED HEALTH CARE EDUCATION/TRAINING PROGRAM

## 2019-05-28 PROCEDURE — 700105 HCHG RX REV CODE 258: Performed by: NURSE PRACTITIONER

## 2019-05-28 PROCEDURE — 770002 HCHG ROOM/CARE - OB PRIVATE (112)

## 2019-05-28 PROCEDURE — 700102 HCHG RX REV CODE 250 W/ 637 OVERRIDE(OP): Performed by: NURSE PRACTITIONER

## 2019-05-28 PROCEDURE — 85027 COMPLETE CBC AUTOMATED: CPT

## 2019-05-28 PROCEDURE — A9270 NON-COVERED ITEM OR SERVICE: HCPCS | Performed by: NURSE PRACTITIONER

## 2019-05-28 PROCEDURE — 36415 COLL VENOUS BLD VENIPUNCTURE: CPT

## 2019-05-28 PROCEDURE — 700111 HCHG RX REV CODE 636 W/ 250 OVERRIDE (IP): Performed by: NURSE PRACTITIONER

## 2019-05-28 PROCEDURE — 304965 HCHG RECOVERY SERVICES

## 2019-05-28 PROCEDURE — 700102 HCHG RX REV CODE 250 W/ 637 OVERRIDE(OP): Performed by: STUDENT IN AN ORGANIZED HEALTH CARE EDUCATION/TRAINING PROGRAM

## 2019-05-28 PROCEDURE — 700111 HCHG RX REV CODE 636 W/ 250 OVERRIDE (IP): Performed by: ANESTHESIOLOGY

## 2019-05-28 PROCEDURE — 59400 OBSTETRICAL CARE: CPT | Performed by: OBSTETRICS & GYNECOLOGY

## 2019-05-28 RX ORDER — DOCUSATE SODIUM 100 MG/1
100 CAPSULE, LIQUID FILLED ORAL 2 TIMES DAILY PRN
Status: DISCONTINUED | OUTPATIENT
Start: 2019-05-28 | End: 2019-05-29 | Stop reason: HOSPADM

## 2019-05-28 RX ORDER — IBUPROFEN 600 MG/1
600 TABLET ORAL EVERY 6 HOURS PRN
Status: DISCONTINUED | OUTPATIENT
Start: 2019-05-28 | End: 2019-05-29 | Stop reason: HOSPADM

## 2019-05-28 RX ORDER — METHYLERGONOVINE MALEATE 0.2 MG/ML
0.2 INJECTION INTRAVENOUS
Status: DISCONTINUED | OUTPATIENT
Start: 2019-05-28 | End: 2019-05-28 | Stop reason: HOSPADM

## 2019-05-28 RX ORDER — SODIUM CHLORIDE, SODIUM LACTATE, POTASSIUM CHLORIDE, CALCIUM CHLORIDE 600; 310; 30; 20 MG/100ML; MG/100ML; MG/100ML; MG/100ML
INJECTION, SOLUTION INTRAVENOUS PRN
Status: DISCONTINUED | OUTPATIENT
Start: 2019-05-28 | End: 2019-05-29 | Stop reason: HOSPADM

## 2019-05-28 RX ORDER — BISACODYL 10 MG
10 SUPPOSITORY, RECTAL RECTAL PRN
Status: DISCONTINUED | OUTPATIENT
Start: 2019-05-28 | End: 2019-05-29 | Stop reason: HOSPADM

## 2019-05-28 RX ORDER — ROPIVACAINE HYDROCHLORIDE 2 MG/ML
INJECTION, SOLUTION EPIDURAL; INFILTRATION; PERINEURAL
Status: COMPLETED
Start: 2019-05-28 | End: 2019-05-28

## 2019-05-28 RX ORDER — SODIUM CHLORIDE, SODIUM LACTATE, POTASSIUM CHLORIDE, AND CALCIUM CHLORIDE .6; .31; .03; .02 G/100ML; G/100ML; G/100ML; G/100ML
250 INJECTION, SOLUTION INTRAVENOUS PRN
Status: DISCONTINUED | OUTPATIENT
Start: 2019-05-28 | End: 2019-05-28 | Stop reason: HOSPADM

## 2019-05-28 RX ORDER — SODIUM CHLORIDE, SODIUM LACTATE, POTASSIUM CHLORIDE, CALCIUM CHLORIDE 600; 310; 30; 20 MG/100ML; MG/100ML; MG/100ML; MG/100ML
INJECTION, SOLUTION INTRAVENOUS CONTINUOUS
Status: DISPENSED | OUTPATIENT
Start: 2019-05-28 | End: 2019-05-28

## 2019-05-28 RX ORDER — ROPIVACAINE HYDROCHLORIDE 2 MG/ML
INJECTION, SOLUTION EPIDURAL; INFILTRATION PRN
Status: DISCONTINUED | OUTPATIENT
Start: 2019-05-28 | End: 2019-05-28 | Stop reason: SURG

## 2019-05-28 RX ORDER — MISOPROSTOL 200 UG/1
800 TABLET ORAL
Status: DISCONTINUED | OUTPATIENT
Start: 2019-05-28 | End: 2019-05-28 | Stop reason: HOSPADM

## 2019-05-28 RX ORDER — ROPIVACAINE HYDROCHLORIDE 2 MG/ML
INJECTION, SOLUTION EPIDURAL; INFILTRATION; PERINEURAL CONTINUOUS
Status: DISCONTINUED | OUTPATIENT
Start: 2019-05-28 | End: 2019-05-29 | Stop reason: HOSPADM

## 2019-05-28 RX ORDER — LIDOCAINE HYDROCHLORIDE AND EPINEPHRINE 15; 5 MG/ML; UG/ML
INJECTION, SOLUTION EPIDURAL PRN
Status: DISCONTINUED | OUTPATIENT
Start: 2019-05-28 | End: 2019-05-28 | Stop reason: SURG

## 2019-05-28 RX ORDER — ONDANSETRON 2 MG/ML
4 INJECTION INTRAMUSCULAR; INTRAVENOUS EVERY 4 HOURS PRN
Status: DISCONTINUED | OUTPATIENT
Start: 2019-05-28 | End: 2019-05-28 | Stop reason: HOSPADM

## 2019-05-28 RX ORDER — HYDROXYZINE 50 MG/1
50 TABLET, FILM COATED ORAL EVERY 6 HOURS PRN
Status: DISCONTINUED | OUTPATIENT
Start: 2019-05-28 | End: 2019-05-28 | Stop reason: HOSPADM

## 2019-05-28 RX ORDER — ACETAMINOPHEN 325 MG/1
325 TABLET ORAL EVERY 4 HOURS PRN
Status: DISCONTINUED | OUTPATIENT
Start: 2019-05-28 | End: 2019-05-29 | Stop reason: HOSPADM

## 2019-05-28 RX ORDER — SODIUM CHLORIDE, SODIUM LACTATE, POTASSIUM CHLORIDE, AND CALCIUM CHLORIDE .6; .31; .03; .02 G/100ML; G/100ML; G/100ML; G/100ML
1000 INJECTION, SOLUTION INTRAVENOUS
Status: DISCONTINUED | OUTPATIENT
Start: 2019-05-28 | End: 2019-05-28 | Stop reason: HOSPADM

## 2019-05-28 RX ORDER — ONDANSETRON 2 MG/ML
INJECTION INTRAMUSCULAR; INTRAVENOUS
Status: COMPLETED
Start: 2019-05-28 | End: 2019-05-28

## 2019-05-28 RX ORDER — VITAMIN A ACETATE, BETA CAROTENE, ASCORBIC ACID, CHOLECALCIFEROL, .ALPHA.-TOCOPHEROL ACETATE, DL-, THIAMINE MONONITRATE, RIBOFLAVIN, NIACINAMIDE, PYRIDOXINE HYDROCHLORIDE, FOLIC ACID, CYANOCOBALAMIN, CALCIUM CARBONATE, FERROUS FUMARATE, ZINC OXIDE, CUPRIC OXIDE 3080; 12; 120; 400; 1; 1.84; 3; 20; 22; 920; 25; 200; 27; 10; 2 [IU]/1; UG/1; MG/1; [IU]/1; MG/1; MG/1; MG/1; MG/1; MG/1; [IU]/1; MG/1; MG/1; MG/1; MG/1; MG/1
1 TABLET, FILM COATED ORAL EVERY MORNING
Status: DISCONTINUED | OUTPATIENT
Start: 2019-05-28 | End: 2019-05-29 | Stop reason: HOSPADM

## 2019-05-28 RX ORDER — TERBUTALINE SULFATE 1 MG/ML
0.25 INJECTION, SOLUTION SUBCUTANEOUS PRN
Status: DISCONTINUED | OUTPATIENT
Start: 2019-05-28 | End: 2019-05-28 | Stop reason: HOSPADM

## 2019-05-28 RX ADMIN — LIDOCAINE HYDROCHLORIDE,EPINEPHRINE BITARTRATE 4 ML: 15; .005 INJECTION, SOLUTION EPIDURAL; INFILTRATION; INTRACAUDAL; PERINEURAL at 07:25

## 2019-05-28 RX ADMIN — ROPIVACAINE HYDROCHLORIDE 6 ML: 2 INJECTION, SOLUTION EPIDURAL; INFILTRATION at 07:29

## 2019-05-28 RX ADMIN — Medication 1 TABLET: at 16:24

## 2019-05-28 RX ADMIN — Medication 125 ML/HR: at 14:12

## 2019-05-28 RX ADMIN — ONDANSETRON 4 MG: 2 INJECTION INTRAMUSCULAR; INTRAVENOUS at 06:28

## 2019-05-28 RX ADMIN — SODIUM CHLORIDE, POTASSIUM CHLORIDE, SODIUM LACTATE AND CALCIUM CHLORIDE: 600; 310; 30; 20 INJECTION, SOLUTION INTRAVENOUS at 06:28

## 2019-05-28 RX ADMIN — FENTANYL CITRATE 100 MCG: 50 INJECTION INTRAMUSCULAR; INTRAVENOUS at 06:28

## 2019-05-28 RX ADMIN — Medication 2 MILLI-UNITS/MIN: at 09:20

## 2019-05-28 RX ADMIN — SODIUM CHLORIDE, POTASSIUM CHLORIDE, SODIUM LACTATE AND CALCIUM CHLORIDE: 600; 310; 30; 20 INJECTION, SOLUTION INTRAVENOUS at 07:04

## 2019-05-28 RX ADMIN — DOCUSATE SODIUM 100 MG: 100 CAPSULE, LIQUID FILLED ORAL at 16:24

## 2019-05-28 RX ADMIN — IBUPROFEN 600 MG: 600 TABLET ORAL at 14:44

## 2019-05-28 RX ADMIN — ROPIVACAINE HYDROCHLORIDE 100 ML: 2 INJECTION, SOLUTION EPIDURAL; INFILTRATION at 07:26

## 2019-05-28 ASSESSMENT — PATIENT HEALTH QUESTIONNAIRE - PHQ9
1. LITTLE INTEREST OR PLEASURE IN DOING THINGS: NOT AT ALL
2. FEELING DOWN, DEPRESSED, IRRITABLE, OR HOPELESS: NOT AT ALL
SUM OF ALL RESPONSES TO PHQ9 QUESTIONS 1 AND 2: 0

## 2019-05-28 ASSESSMENT — ENCOUNTER SYMPTOMS
MUSCULOSKELETAL NEGATIVE: 1
PSYCHIATRIC NEGATIVE: 1
GASTROINTESTINAL NEGATIVE: 1
CONSTITUTIONAL NEGATIVE: 1
EYES NEGATIVE: 1
CARDIOVASCULAR NEGATIVE: 1
NEUROLOGICAL NEGATIVE: 1
RESPIRATORY NEGATIVE: 1

## 2019-05-28 ASSESSMENT — PAIN SCALES - GENERAL: PAIN_LEVEL: 0

## 2019-05-28 ASSESSMENT — LIFESTYLE VARIABLES
ALCOHOL_USE: NO
EVER_SMOKED: NEVER

## 2019-05-28 NOTE — L&D DELIVERY NOTE
Patient was admitted to Labor and Delivery at 39w3d who presented in active labor.     Yousuf is a 39 y/o  G2 now  who presented with regular painful contractions. Labor augmented with amniotomy and pitocin. Amniotomy returned small amount of clear fluid, though small amount of terminal meconium noted at delivery. Patient progressed in active labor to complete cervical dilation to completing first stage of labor.  Patient then progressed through second stage after pushing <30 minutes and spontaneously delivered a viable female infant in OP over an intact perineum.  No nuchal cord.  Infant Apgar 8/9, and weight pending. Infant was vigorous and was placed on mother's abdomen immediately following delivery. After delayed cord clamping, cord was cut by FOB.      During the third stage the placenta was delivered spontaneously and was intact with 3 vessels. Mother and infant doing well.      The patient sustained a mucosal laceration to the right vaginal wall which was repaired with a running suture using 2-0 chromic. The patient tolerated the repair well.    Assisted extraction:  none     Perineum repair:  Vaginal wall repair as above    Analgesia: none     Epidural:  yes     Family support:  yes     Infant bonding:  excellent     Estimated blood loss:  250 mL     Needle count correct:  yes     Patient tolerated the procedure:  excellent    Dr. Magana attended the delivery.    Nicki Calvin, PGY-1

## 2019-05-28 NOTE — ANESTHESIA PREPROCEDURE EVALUATION
Relevant Problems   No relevant active problems       Physical Exam    Airway   Mallampati: III  TM distance: >3 FB  Neck ROM: full       Cardiovascular   Rhythm: regular  Rate: normal     Dental    Pulmonary    Abdominal   (+) obese     Neurological            Anesthesia Plan    ASA 2       Plan - epidural   Neuraxial block will be labor analgesia      Plan Factors:   Patient was not previously instructed to abstain from smoking on day of procedure.  Patient did not smoke on day of procedure.          Pertinent diagnostic labs and testing reviewed    Informed Consent:    Anesthetic plan and risks discussed with patient.    Use of blood products discussed with: patient whom.

## 2019-05-28 NOTE — ANESTHESIA PROCEDURE NOTES
Epidural Block  Performed by: TRACY MARROQUIN  Authorized by: TRACY MARROQUIN     Patient Location:  OB  Start Time:  5/28/2019 7:12 AM  End Time:  5/28/2019 7:35 AM  Reason for Block: at surgeon's request and labor analgesia    patient identified, IV checked, site marked, risks and benefits discussed, surgical consent, monitors and equipment checked, pre-op evaluation, timeout performed and at patient's request    Patient Position:  Sitting  Prep: ChloraPrep    Monitoring:  Blood pressure and continuous pulse oximetry  Approach:  Midline  Location:  L2-L3  Injection Technique:  EVELIA saline and EVELIA air  Strength:  1%  Dose:  5ml  Needle Type:  Tuohy  Needle Gauge:  18 G  Loss of resistance::  6  Catheter at Skin Depth:  15  Test Dose Result:  Negative  Events: paresthesia-transient/resolved

## 2019-05-28 NOTE — H&P
Obstetrics & Gynecology History & Physical Note    Date of Service  2019    Chief Complaint  Labour contractions    History of Presenting Illness  Yousuf Guzman is a 38 y.o.  at 39w3d 2019, by Ultrasound. Patient's last menstrual period was 2018 (approximate). She is being admitted for labor management.  She started  Hernandez yesterday morning and had a small amount of mucous and scant bleeding.  Then last night they became more intense and getting closer towards this morning.      Prenatal care is at [facility] starting at 18w5d for a total of 10 visits  and is complicated by:    Patient Active Problem List    Diagnosis Date Noted   • At risk breastfeeding problem 2019   • Close pregnancy spacing- last  2018 01/15/2019   • AMA (advanced maternal age) multigravida 35+ 01/10/2019       Obstetric History  OB History    Para Term  AB Living   2 1 1     1   SAB TAB Ectopic Molar Multiple Live Births             1      # Outcome Date GA Lbr Tato/2nd Weight Sex Delivery Anes PTL Lv   2 Current            1 Term 18 40w5d  3.92 kg (8 lb 10.3 oz) M Vag-Spont   LJ          Gynecologic History  Denies STI, HSV, abnormal pap    Review of Systems  Review of Systems   Constitutional: Negative.    HENT: Negative.    Eyes: Negative.    Respiratory: Negative.    Cardiovascular: Negative.    Gastrointestinal: Negative.    Genitourinary: Negative.    Musculoskeletal: Negative.    Skin: Negative.    Neurological: Negative.    Endo/Heme/Allergies: Negative.    Psychiatric/Behavioral: Negative.    All other systems reviewed and are negative.      Medical History   has no past medical history of Addisons disease ([facility]); Adrenal disorder ([facility]); Allergy; Anemia; Anxiety; Arrhythmia; Arthritis; Asthma; Blood transfusion without reported diagnosis; Cancer ([facility]); Cataract; CHF (congestive heart failure) ([facility]); Clotting disorder ([facility]); COPD (chronic obstructive pulmonary disease) ([facility]);  "Cushings syndrome (HCC); Depression; Diabetes (HCC); Diabetic neuropathy (HCC); GERD (gastroesophageal reflux disease); Glaucoma; Goiter; Headache(784.0); Heart attack (HCC); Heart murmur; HIV (human immunodeficiency virus infection) (HCC); Hyperlipidemia; Hypertension; IBD (inflammatory bowel disease); Kidney disease; Meningitis; Migraine; Muscle disorder; Osteoporosis; Parathyroid disorder (HCC); Pituitary disease (HCC); Pulmonary emphysema (HCC); Seizure (HCC); Sickle cell disease (HCC); Stroke (HCC); Substance abuse (HCC); Thyroid disease; Tuberculosis; Ulcer; or Urinary tract infection, site not specified.    Surgical History   has no past surgical history on file.     Family History  family history includes Dementia in her maternal grandmother; No Known Problems in her brother and sister.     Social History   reports that she has never smoked. She has never used smokeless tobacco. She reports that she does not drink alcohol or use drugs.    Allergies  No Known Allergies    Medications  Prior to Admission Medications   Prescriptions Last Dose Informant Patient Reported? Taking?   Prenatal MV-Min-Fe Fum-FA-DHA (PRENATAL 1 PO)   Yes No   Sig: Take  by mouth.   docusate sodium 100 MG Cap   No No   Sig: Take 100 mg by mouth 2 times a day as needed.   Patient not taking: Reported on 1/15/2019   ibuprofen (MOTRIN) 800 MG Tab   No No   Sig: Take 1 Tab by mouth every 8 hours as needed (For cramping after delivery; do not give if patient is receiving ketorolac (Toradol)).   Patient not taking: Reported on 1/15/2019      Facility-Administered Medications: None       Physical Exam  Vitals:    05/28/19 0546   BP: 137/86   Pulse: 76   Temp: 36.5 °C (97.7 °F)   TempSrc: Temporal   Weight: 112.5 kg (248 lb)   Height: 1.626 m (5' 4\")       General:   alert, cooperative, no distress   Skin:   normal   HEENT:  PERRLA and extraocular movements intact   Lungs:   clear to auscultation bilaterally   Heart:   regular rate and rhythm "   Breasts:   deferred   Abdomen:  Abdomen soft, non-tender; gravid.   Pelvis: Exam deferred.  EFW 3600g  adequate pelvis proven to 8'10   FHT:  130 BPM Fetal heart variability: moderate  Fetal Heart Rate decelerations: none  Fetal Heart Rate accelerations: yes   [place]:  difficult to monitor on TOCO but palpable every 5 minutes   Presentations: Vertex by RN CONSTANTIN   Cervix:     Dilation:  4    Effacement:  70    Station:   high    Consistency:      Position:         Laboratory:  Prenatal Results     General (Most Recent Result)     Test Value Reference Range Date Time    ABO O   01/18/19 1547    Rh POS   01/18/19 1547    Antibody screen NEG   01/18/19 1547    HbA1c        Gonorrhea Negative  Negative 01/15/19 1042    Chlamydia  by PCR Negative  Negative 01/15/19 1042    Chlamydia by DNA        RPR/Syphilus Non Reactive  Non Reactive 02/22/19 1348    HSV 1/2 by PCR (non-serum)        HSV 1/2 (serum)        HSV 1        HSV 2        HPV (16)        HPV (18)        HPV (other)        HBsAg Negative  Negative 01/18/19 1540    HIV-1 HIV-2 Antibodies Non Reactive  Non Reactive 01/18/19 1540    Rubella 203.70 IU/mL IU/mL 01/18/19 1540    Tb              Pap Smear (Most Recent Result)     Test Value Reference Range Date Time    Pap smear        Pap smear w/HPV        Pap smear w/CTNG        Pap smar w/HPV CTNG        Pap smear (reflex HPV ACUS)        Pap smear (reflex HPV ASCUS w/CTNG)  (See Report)    09/13/17 1155    Pathology gyn specimen  (See Report)    09/13/17 1155          Urinalysis (Most Recent Result)     Test Value Reference Range Date Time    Urinalysis        Urinalysis (culture if indicated)        POC urinalysis  (See Report)    01/15/19 1015    Urine drug screen        Urine drug screen (w/o conf)        Urine culture ([id number])        Urine culture ([id number])              1st Trimester     Test Value Reference Range Date Time    Hgb        Hct        Fasting Glucose Tolerance        GTT, 1 hour        GTT,  2 hours        GTT, 3 hours              2nd Trimester     Test Value Reference Range Date Time    Hgb 12.8 g/dL 12.0 - 16.0 g/dL 19 1348    Hct 38.5 % 37.0 - 47.0 % 19 1348    Urinalysis        Urine Culture        AST        ALT        Uric Acid        Fasting Glucose Tolerance        GTT, 1 hour        GTT, 2 hours        GTT, 3 hours        Urine Protein/Creatinine Ratio              3rd Trimester     Test Value Reference Range Date Time    Hgb        Hct        Platelet count        GBS (SALGADO BROTH) Negative  Negative 19 1409    GBS (Direct) Negative  Negative 19 1409    Urinalysis        Urine Culture        Urine Drug Screen        Urine Protein/Creatinine Ratio              Congenital Disease Screening     Test Value Reference Range Date Time    First Trimester Screen        Quad Screen        Sickle Cell        Thalassemia        [facility]        Cystic Fibrosis Carrier Study                      Urinalysis:    No results found     Imagin19 CAITLIN 16.59 survey WNL, ERICKA adjusted      Assessment:  38 y.o.  39w3d who presents with  Pregnancy  Labor and delivery indication for care or intervention    Plan:  No new Assessment & Plan notes have been filed under this hospital service since the last note was generated.  Service: Obstetrics & Gynecology    1. Admit to L&D  2. Consider AROM once epidural in place   3. GBS: negative  4.  Pain Control: desires epidural but may have fentanyl while waiting    VTE prophylaxis: pt ambulatory at this time    ZEENAT Friend Dr attending MD

## 2019-05-28 NOTE — ANESTHESIA POSTPROCEDURE EVALUATION
Patient: Yousuf Guzman    Procedure Summary     Date:  05/28/19 Room / Location:      Anesthesia Start:  0710 Anesthesia Stop:  1115    Procedure:  Labor Epidural Diagnosis:      Scheduled Providers:   Responsible Provider:  Kristina Gottlieb M.D.    Anesthesia Type:  epidural ASA Status:  2          Final Anesthesia Type: epidural  Last vitals  BP   Blood Pressure: 126/72    Temp   36.7 °C (98 °F)    Pulse   Pulse: 93   Resp        SpO2   98 %      Anesthesia Post Evaluation    Patient location during evaluation: bedside  Patient participation: complete - patient participated  Level of consciousness: awake and alert  Pain score: 0    Airway patency: patent  Anesthetic complications: no  Cardiovascular status: adequate  Respiratory status: acceptable  Hydration status: acceptable    PONV: none           Nurse Pain Score: 0 (NPRS)

## 2019-05-28 NOTE — PROGRESS NOTES
"Labor Progress Note:      S:  Pt reports feeling very comfortable with epidural    Patient Vitals for the past 4 hrs:   BP Temp Temp src Pulse SpO2 Height Weight   19 0900 116/72 - - 74 - - -   19 0834 109/68 - - 82 - - -   19 0816 120/74 - - 78 - - -   19 0800 139/93 - - (!) 102 98 % - -   19 0703 157/79 36.7 °C (98 °F) - 92 - - -   19 0546 137/86 36.5 °C (97.7 °F) Temporal 76 - 1.626 m (5' 4\") 112.5 kg (248 lb)       Gen: AAO, NAD    SVE: 6-7/90/-2    FHT: 130/moderate variability/+ accels/+ early decels  toco: ctxQ5-6 minutes      A/P: 38 y.o.  @ 39w3d by LMP admitted for active labor  - labor: Making cervical change, contractions every 5-6 minutes  - FHT: cat 1  - GBS: neg  - pain: controlled with epidural    Nicki Calvin, PGY-1    "

## 2019-05-28 NOTE — PROGRESS NOTES
"Yousuf Guzman   39w3d    Subjective: Resting comfortably with epidural in place.      uterine contractions:yes, pain: .no  nausea/vomiting: .noepigastric pain:.no:      fetal movement: normal, vaginal bleeding: .no    Objective:   Vitals:    05/28/19 0546 05/28/19 0703 05/28/19 0800   BP: 137/86 157/79 139/93   Pulse: 76 92 (!) 102   Temp: 36.5 °C (97.7 °F) 36.7 °C (98 °F)    TempSrc: Temporal     SpO2:   98%   Weight: 112.5 kg (248 lb)     Height: 1.626 m (5' 4\")       Fetal heart variability: moderate  Fetal Heart Rate decelerations: none  Fetal Heart Rate accelerations: yes  Baseline FHR: 125 per minute  Contractions: palpable every 4-5 minutes, difficulty tracing with TOCO  Cervical Exam 5/90/-2  Fetal position: Cephalic  Membranes: ruptured: .N\A  AROM: .yes, Meconium: .no  IUPC: .N\A, FSE .N\A    Meds:     Epidural : .yes  Magnesium sulfate: .no  Pitocin: .no     Labs:    Lab:   Recent Results (from the past 48 hour(s))   Hold Blood Bank Specimen (Not Tested)    Collection Time: 05/28/19  6:15 AM   Result Value Ref Range    Holding Tube - Bb DONE    CBC WITH DIFFERENTIAL    Collection Time: 05/28/19  6:15 AM   Result Value Ref Range    WBC 7.6 4.8 - 10.8 K/uL    RBC 4.04 (L) 4.20 - 5.40 M/uL    Hemoglobin 13.7 12.0 - 16.0 g/dL    Hematocrit 40.9 37.0 - 47.0 %    .2 (H) 81.4 - 97.8 fL    MCH 33.9 (H) 27.0 - 33.0 pg    MCHC 33.5 (L) 33.6 - 35.0 g/dL    RDW 47.4 35.9 - 50.0 fL    Platelet Count 199 164 - 446 K/uL    MPV 11.0 9.0 - 12.9 fL    Neutrophils-Polys 73.00 (H) 44.00 - 72.00 %    Lymphocytes 19.00 (L) 22.00 - 41.00 %    Monocytes 7.20 0.00 - 13.40 %    Eosinophils 0.40 0.00 - 6.90 %    Basophils 0.10 0.00 - 1.80 %    Immature Granulocytes 0.30 0.00 - 0.90 %    Nucleated RBC 0.00 /100 WBC    Neutrophils (Absolute) 5.58 2.00 - 7.15 K/uL    Lymphs (Absolute) 1.45 1.00 - 4.80 K/uL    Monos (Absolute) 0.55 0.00 - 0.85 K/uL    Eos (Absolute) 0.03 0.00 - 0.51 K/uL    Baso (Absolute) 0.01 0.00 - " 0.12 K/uL    Immature Granulocytes (abs) 0.02 0.00 - 0.11 K/uL    NRBC (Absolute) 0.00 K/uL       Ass:   39w3d admitted in active labor  Labor State: Active phase labor. and Satisfactory labor progress.  GBS negative    P.   1. Anticipate   2. AROM performed easily for scant amount clear fluid by BARBARA Calvin, resident  3. Reassess progress in 1-2 hours and IUPC and pitocin if not progressing    Sydney Hoang CNM

## 2019-05-28 NOTE — PROGRESS NOTES
39yo     =39-3  GBS negative    0537-Pt presents to L&D c/o UC's every 5-10 minutes that have become stronger and more intense since 3:30 this morning. Pt denies LOF or VB and confirms +FM. TOCO and EFM in place. POC disucssed  0545-SVE /-3 with BBOW  0610-Report given to GONZALO Hoang CNM in department, admit orders received  0700-Report to WILBER Mcgovern RN

## 2019-05-28 NOTE — ANESTHESIA TIME REPORT
Anesthesia Start and Stop Event Times     Date Time Event    5/28/2019 0710 Anesthesia Start        Responsible Staff  05/28/19    Name Role Begin End    Kristina Gottlieb M.D. Anesth 0710         Preop Diagnosis (Free Text):  Pre-op Diagnosis             Preop Diagnosis (Codes):    Post op Diagnosis  Pregnancy      Premium Reason  Non-Premium    Comments:

## 2019-05-28 NOTE — ANESTHESIA QCDR
2019 [facility] Clinical Data Registry (for Quality Improvement)     Postoperative nausea/vomiting risk protocol (Adult = 18 yrs and Pediatric 3-17 yrs)- (430 and 463)  General inhalation anesthetic (NOT TIVA) with PONV risk factors: No  Provision of anti-emetic therapy with at least 2 different classes of agents: N/A  Patient DID NOT receive anti-emetic therapy and reason is documented in Medical Record: N/A    Multimodal Pain Management- (AQI59)  Patient undergoing Elective Surgery (i.e. Outpatient, or ASC, or Prescheduled Surgery prior to Hospital Admission): Yes  Use of Multimodal Pain Management, two or more drugs and/or interventions, NOT including systemic opioids: Yes   Exception: Documented allergy to multiple classes of analgesics:  N/A    PACU assessment of acute postoperative pain prior to Anesthesia Care End- Applies to Patients Age = 18- (ABG7)  Initial PACU pain score is which of the following: < 7/10  Patient unable to report pain score: N/A    Post-anesthetic transfer of care checklist/protocol to PACU/ICU- (426 and 427)  Upon conclusion of case, patient transferred to which of the following locations: PACU/Non-ICU  Use of transfer checklist/protocol: Yes  Exclusion: Service Performed in Patient Hospital Room (and thus did not require transfer): N/A    PACU Reintubation- (AQI31)  General anesthesia requiring endotracheal intubation (ETT) along with subsequent extubation in OR or PACU: No  Required reintubation in the PACU: N/A  Extubation was a planned trial documented in the medical record prior to removal of the original airway device: N/A    Unplanned admission to ICU related to anesthesia service up through end of PACU care- (MD51)  Unplanned admission to ICU (not initially anticipated at anesthesia start time): No

## 2019-05-28 NOTE — CARE PLAN
Problem: Infection  Goal: Will remain free from infection  Outcome: PROGRESSING AS EXPECTED  Pt is free from infection  Intervention: Assess signs and symptoms of infection  Pt is free from s/s of infection  Intervention: Implement standard precautions and perform hand washing before and after patient contact  Hand hygiene performed before and after pt contact      Problem: Pain  Goal: Alleviation of Pain or a reduction in pain to the patient's comfort goal    Intervention: Pain Management - Epidural/Spinal  Pt is free from pain with an epidural in place  Intervention: Pain Management--Medications  Pt educated on medications available for pain

## 2019-05-28 NOTE — PROGRESS NOTES
1115: , viable female per Dr. Calvin/Izzy, baby assigned 8/9 APGARS, baby skin to skin with pt  1445: Pt ambulated, voided, yolanda-care and gown change complete, pt tolerated well. Pt and baby transferred to postpartum via wheelchair, report given to Kimberly BOYER, pt in stable condition with a firm fundus and light lochia, ID bands verified.

## 2019-05-28 NOTE — PROGRESS NOTES
0700: Assumed care of pt. AAO, pt ready for an epidural. POC discussed, pt verbalized understanding.  0705: Dr. Vail to bedside for placement of epidural, pt tolerated well.

## 2019-05-28 NOTE — CARE PLAN
Problem: Pain  Goal: Alleviation of Pain or a reduction in pain to the patient's comfort goal  Outcome: PROGRESSING AS EXPECTED  Pain POC discussed. Pt desires epidural    Problem: Risk for Infection, Impaired Wound Healing  Goal: Remain free from signs and symptoms of infection  Outcome: PROGRESSING AS EXPECTED  Pt afebrile, no s/s of infection

## 2019-05-29 VITALS
HEART RATE: 76 BPM | BODY MASS INDEX: 42.34 KG/M2 | TEMPERATURE: 97.7 F | SYSTOLIC BLOOD PRESSURE: 110 MMHG | OXYGEN SATURATION: 94 % | HEIGHT: 64 IN | WEIGHT: 248 LBS | RESPIRATION RATE: 18 BRPM | DIASTOLIC BLOOD PRESSURE: 57 MMHG

## 2019-05-29 PROBLEM — O09.529 AMA (ADVANCED MATERNAL AGE) MULTIGRAVIDA 35+: Status: RESOLVED | Noted: 2019-01-10 | Resolved: 2019-05-29

## 2019-05-29 PROCEDURE — A9270 NON-COVERED ITEM OR SERVICE: HCPCS | Performed by: STUDENT IN AN ORGANIZED HEALTH CARE EDUCATION/TRAINING PROGRAM

## 2019-05-29 PROCEDURE — 700102 HCHG RX REV CODE 250 W/ 637 OVERRIDE(OP): Performed by: NURSE PRACTITIONER

## 2019-05-29 PROCEDURE — 700112 HCHG RX REV CODE 229: Performed by: STUDENT IN AN ORGANIZED HEALTH CARE EDUCATION/TRAINING PROGRAM

## 2019-05-29 PROCEDURE — A9270 NON-COVERED ITEM OR SERVICE: HCPCS | Performed by: NURSE PRACTITIONER

## 2019-05-29 PROCEDURE — 700102 HCHG RX REV CODE 250 W/ 637 OVERRIDE(OP): Performed by: STUDENT IN AN ORGANIZED HEALTH CARE EDUCATION/TRAINING PROGRAM

## 2019-05-29 RX ORDER — IBUPROFEN 800 MG/1
800 TABLET ORAL EVERY 6 HOURS PRN
Qty: 30 TAB | Refills: 0 | Status: SHIPPED | OUTPATIENT
Start: 2019-05-29 | End: 2019-07-11

## 2019-05-29 RX ADMIN — IBUPROFEN 600 MG: 600 TABLET ORAL at 12:09

## 2019-05-29 RX ADMIN — IBUPROFEN 600 MG: 600 TABLET ORAL at 02:19

## 2019-05-29 RX ADMIN — DOCUSATE SODIUM 100 MG: 100 CAPSULE, LIQUID FILLED ORAL at 12:09

## 2019-05-29 RX ADMIN — Medication 1 TABLET: at 12:09

## 2019-05-29 NOTE — CONSULTS
Met with MOB for an initial follow-up lactation visit.  MOB delivered her second baby today at 1115 at 39.3 weeks gestation.  Infant is approximately 9 hours old.  MOB stated she did not breastfeed her first baby, who is approximately 15 months old, and stated will feed this new baby both breast milk and formula.    Latch assistance offered, but MOB declined.  MOB stated infant is latching onto the breast without difficulty and is feeding well.  MOB denied pain and tissue damage to nipples and areolas with latch.    Breastfeeding Plan:  Offer infant the breast on demand per feeding cues and within 3 hours from the last feed for a minimum of 8-12 times in a 24 hour period.  MOB encouraged to offer infant the breast first and to offer infant formula afterwards, if infant still appears hungry per the 10-20-30 supplementation guidelines.  MOB provided with a copy of these guidelines along with instructions on use.    MOB stated does not wish to be screened for the [facility] program.    MOB was informed of the outpatient lactation assistance available to her through the [facility].  Invited MOB to attend Breastfeeding [place].    MOB verbalized understanding of all information provided to her and denied having any further questions at this time.  Encouraged MOB to call for lactation assistance as needed.

## 2019-05-29 NOTE — DISCHARGE SUMMARY
Discharge Summary:      Yousuf Guzman      Admit Date:   2019  Discharge Date:  2019     Admitting diagnosis:  Pregnancy  Labor and delivery indication for care or intervention  Discharge Diagnosis: Status post vaginal, spontaneous.  Pregnancy Complications: Advanced maternal age  Tubal Ligation:  N\A        History:  History reviewed. No pertinent past medical history.  OB History    Para Term  AB Living   2 1 1     1   SAB TAB Ectopic Molar Multiple Live Births             1      # Outcome Date GA Lbr Tato/2nd Weight Sex Delivery Anes PTL Lv   2 Current            1 Term 18 40w5d  3.92 kg (8 lb 10.3 oz) M Vag-Spont   LJ           Patient has no known allergies.  Patient Active Problem List    Diagnosis Date Noted   •  (normal spontaneous vaginal delivery) 2019   • Postpartum care and examination of lactating mother 2019   • At risk breastfeeding problem 2019   • Close pregnancy spacing- last  2018 01/15/2019        Hospital Course:   38 y.o. , now para 2, was admitted with the above mentioned diagnosis, underwent Active Labor, vaginal, spontaneous. Patient postpartum course was unremarkable, with progressive advancement in diet , ambulation and toleration of oral analgesia. Patient without complaints today and desires discharge.      Vitals:    19 1145 19 1500 19 1800 19 2200   BP: 126/72 132/93 107/68 119/74   Pulse: 93 84 64 89   Resp:  16 16 18   Temp:  37.4 °C (99.4 °F) 37.2 °C (99 °F) 36.3 °C (97.3 °F)   TempSrc:  Oral Temporal Temporal   SpO2:  95% 93% 95%   Weight:       Height:           Current Facility-Administered Medications   Medication Dose   • oxytocin (PITOCIN) infusion (for postpartum)  2,000 mL/hr    Followed by   • oxytocin (PITOCIN) infusion (for postpartum)   mL/hr   • ibuprofen (MOTRIN) tablet 600 mg  600 mg   • acetaminophen (TYLENOL) tablet 325 mg  325 mg   • ropivacaine (NAROPIN)  injection     • oxytocin (PITOCIN) 20 UNITS/1000ML LR (induction of labor)  0.5-20 luna-units/min   • LR infusion     • docusate sodium (COLACE) capsule 100 mg  100 mg   • bisacodyl (DULCOLAX) suppository 10 mg  10 mg   • prenatal plus vitamin (STUARTNATAL 1+1) 27-1 MG tablet 1 Tab  1 Tab       Exam:  Breast Exam: negative  Abdomen: Abdomen soft, non-tender. BS normal. No masses,  No organomegaly  Fundus Non Tender: yes  Incision: none  Perineum: perineum intact  Extremity: extremities, peripheral pulses and reflexes normal, no edema, redness or tenderness in the calves or thighs, Homans sign is negative, no sign of DVT, feet normal, good pulses, normal color, temperature and sensation     Labs:  Recent Labs      05/28/19   0615  05/28/19   2209   WBC  7.6  9.4   RBC  4.04*  3.78*   HEMOGLOBIN  13.7  13.1   HEMATOCRIT  40.9  38.6   MCV  101.2*  102.1*   MCH  33.9*  34.7*   MCHC  33.5*  33.9   RDW  47.4  48.0   PLATELETCT  199  177   MPV  11.0  11.0        Activity:   Discharge to home  Pelvic Rest x 6 weeks    Assessment:  normal postpartum course  Discharge Assessment: Taking adequate diet and fluids, no heavy bleeding or foul discharge. Voiding without difficulty     Follow up: .TPC or [facility] in 5 weeks for vaginal     Discharge Meds:   Current Outpatient Prescriptions   Medication Sig Dispense Refill   • ibuprofen (MOTRIN) 800 MG Tab Take 1 Tab by mouth every 6 hours as needed (For cramping after delivery; do not give if patient is receiving ketorolac (Toradol)). 30 Tab 0     Pt need to RT TPC or ER if any of the following occur:  Fever over 100,5  Severe abd pain  Red streaks or painful masses in the breasts  Foul smelling d/c or lochia  Heavy vaginal bleeding saturating a pad per hour  S/s of PP depression  Unsure of desired BCM    LILY Walter.N.M.

## 2019-05-29 NOTE — DISCHARGE INSTRUCTIONS
POSTPARTUM DISCHARGE INSTRUCTIONS FOR MOM    YOB: 1980   Age: 38 y.o.               Admit Date: 5/28/2019     Discharge Date: 5/29/2019  Attending Doctor:  Bruce Ball M.D.                  Allergies:  Patient has no known allergies.    Discharged to home by car. Discharged via wheelchair, hospital escort: Yes.  Special equipment needed: None  Belongings with: Personal  Be sure to schedule a follow-up appointment with your primary care doctor or any specialists as instructed.     Discharge Plan:   Diet Plan: Discussed  Activity Level: Discussed  Confirmed Follow up Appointment: Patient to Call and Schedule Appointment  Confirmed Symptoms Management: Discussed  Medication Reconciliation Updated: Yes  Influenza Vaccine Indication: Not indicated: Previously immunized this influenza season and > 8 years of age    REASONS TO CALL YOUR OBSTETRICIAN:  1.   Persistent fever or shaking chills (Temperature higher than 100.4)  2.   Heavy bleeding (soaking more than 1 pad per hour); Passing clots  3.   Foul odor from vagina  4.   Mastitis (Breast infection; breast pain, chills, fever, redness)  5.   Urinary pain, burning or frequency  6.   Episiotomy infection  7.   Severe depression longer than 24 hours    HAND WASHING  · Prior to handling the baby.  · Before breastfeeding or bottle feeding baby.  · After using the bathroom or changing the baby's diaper.    VAGINAL CARE  · Nothing inside vagina for 6 weeks: no sexual intercourse, tampons or douching.  · Bleeding may continue for 2-4 weeks.  Amount may vary.    · Call your physician for heavy bleeding which means soaking more than 1 pad per hour    BIRTH CONTROL  · It is possible to become pregnant at any time after delivery and while breastfeeding.  · Plan to discuss a method of birth control with your physician at your follow up visit. visit.    DIET AND ELIMINATION  · Eating more fiber (bran cereal, fruits, and vegetables) and drinking plenty of fluids will  "help to avoid constipation.  · Urinary frequency after childbirth is normal.    POSTPARTUM BLUES  During the first few days after birth, you may experience a sense of the \"blues\" which may include impatience, irritability or even crying.  These feeling come and go quickly.  However, as many as 1 in 10 women experience emotional symptoms known as postpartum depression.    Postpartum depression:  May start as early as the second or third day after delivery or take several weeks or months to develop.  Symptoms of \"blues\" are present, but are more intense:  Crying spells; loss of appetite; feelings of hopelessness or loss of control; fear of touching the baby; over concern or no concern at all about the baby; little or no concern about your own appearance/caring for yourself; and/or inability to sleep or excessive sleeping.  Contact your physician if you are experiencing any of these symptoms.    Crisis Hotline:  · [place] Crisis Hotline:  6-552-RNTWUWS  Or 1-369.804.2549  · [place] Crisis Hotline:  1-372.278.7111  Or 559-227-7916    PREVENTING SHAKEN BABY:  If you are angry or stressed, PUT THE BABY IN THE CRIB, step away, take some deep breaths, and wait until you are calm to care for the baby.  DO NOT SHAKE THE BABY.  You are not alone, call a supporter for help.    · Crisis Call Center 24/7 crisis line 686-310-7032 or 1-514.255.8753  · You can also text them, text \"ANSWER\" to 447239    QUIT SMOKING/TOBACCO USE:  I understand the use of any tobacco products increases my chance of suffering from future heart disease and could cause other illnesses which may shorten my life. Quitting the use of tobacco products is the single most important thing I can do to improve my health. For further information on smoking / tobacco cessation call a Toll Free Quit Line at 1-989.992.6667 (*National [employer] [place]) or 1-911.374.9677 (American Lung Association) or you can access the web based program at www.lungusa.org.    · [place] " Tobacco Users Help Line:  (250) 634-1030       Toll Free: 1-574.668.9272  · Quit Tobacco Program Replaced by [facility] ([phone]    DEPRESSION / SUICIDE RISK:  As you are discharged from this [facility], it is important to learn how to keep safe from harming yourself.    Recognize the warning signs:  · Abrupt changes in personality, positive or negative- including increase in energy   · Giving away possessions  · Change in eating patterns- significant weight changes-  positive or negative  · Change in sleeping patterns- unable to sleep or sleeping all the time   · Unwillingness or inability to communicate  · Depression  · Unusual sadness, discouragement and loneliness  · Talk of wanting to die  · Neglect of personal appearance   · Rebelliousness- reckless behavior  · Withdrawal from people/activities they love  · Confusion- inability to concentrate     If you or a loved one observes any of these behaviors or has concerns about self-harm, here's what you can do:  · Talk about it- your feelings and reasons for harming yourself  · Remove any means that you might use to hurt yourself (examples: pills, rope, extension cords, firearm)  · Get professional help from the community (Mental Health, Substance Abuse, psychological counseling)  · Do not be alone:Call your Safe Contact- someone whom you trust who will be there for you.  · Call your local CRISIS HOTLINE 738-4346 or 889-133-1321  · Call your local Children's Mobile Crisis Response Team [place] (144) 699-5168 or www.[employer]  · Call the toll free National Suicide Prevention Hotlines   · National Suicide Prevention Lifeline 102-134-TPKC (9976)  · National Hope Line Network 800-SUICIDE (958-1318)    DISCHARGE SURVEY:  Thank you for choosing Replaced by [facility].  We hope we provided you with very good care.  You may be receiving a survey in the mail.  Please fill it out.  Your opinion is valuable to us.    ADDITIONAL EDUCATIONAL  MATERIALS GIVEN TO PATIENT:        My signature on this form indicates that:  1.  I have reviewed and understand the above information  2.  My questions regarding this information have been answered to my satisfaction.  3.  I have formulated a plan with my discharge nurse to obtain my prescribed medication for home.

## 2019-05-29 NOTE — PROGRESS NOTES
Report received from Missy RN @ the change of shift.  Assumed care. Discussed plan of care especially feeding plan for baby. Patient would like to go home today. Assessment done. Denies pain. Encouraged to call if with needs. Will check at intervals.

## 2019-05-29 NOTE — PROGRESS NOTES
"Yousuf Rae Megan PP day 1     Subjective: Abdominal pain. no, ambulating .yes, tolerating liquids .yes, tolerating regular diet .yes, flatus.no, BM .no, Bleeding .yes, voiding .yes,dizziness .no, breast feeding.yes, breast tenderness .no    Upon entry to room patient in bathroom. She is ambulating back to bed without difficulty. She expresses concern regarding infant not sleeping last night. Infant currently in bassinet asleep. Patient is breastfeeding but did not breastfeed prior children. She is concerned she needs to use formula.     /74   Pulse 89   Temp 36.3 °C (97.3 °F) (Temporal)   Resp 18   Ht 1.626 m (5' 4\")   Wt 112.5 kg (248 lb)   SpO2 95%   Breast Exam: Tenderness .no, Engourgement .no, Mastitis .no  Abdomen soft, non-tender. BS normal. No masses,  No organomegaly  Fundus Tenderness:no, Below umbilicus:Yes, U-2  Perineumperineum intact  ExtremitiesNormal    Meds:   No current facility-administered medications on file prior to encounter.      Current Outpatient Prescriptions on File Prior to Encounter   Medication Sig Dispense Refill   • ibuprofen (MOTRIN) 800 MG Tab Take 1 Tab by mouth every 8 hours as needed (For cramping after delivery; do not give if patient is receiving ketorolac (Toradol)). (Patient not taking: Reported on 1/15/2019) 30 Tab 6   • docusate sodium 100 MG Cap Take 100 mg by mouth 2 times a day as needed. (Patient not taking: Reported on 1/15/2019) 60 Cap 6   • Prenatal MV-Min-Fe Fum-FA-DHA (PRENATAL 1 PO) Take  by mouth.         Lab:   Recent Results (from the past 48 hour(s))   Hold Blood Bank Specimen (Not Tested)    Collection Time: 05/28/19  6:15 AM   Result Value Ref Range    Holding Tube - Bb DONE    CBC WITH DIFFERENTIAL    Collection Time: 05/28/19  6:15 AM   Result Value Ref Range    WBC 7.6 4.8 - 10.8 K/uL    RBC 4.04 (L) 4.20 - 5.40 M/uL    Hemoglobin 13.7 12.0 - 16.0 g/dL    Hematocrit 40.9 37.0 - 47.0 %    .2 (H) 81.4 - 97.8 fL    MCH 33.9 (H) 27.0 - " 33.0 pg    MCHC 33.5 (L) 33.6 - 35.0 g/dL    RDW 47.4 35.9 - 50.0 fL    Platelet Count 199 164 - 446 K/uL    MPV 11.0 9.0 - 12.9 fL    Neutrophils-Polys 73.00 (H) 44.00 - 72.00 %    Lymphocytes 19.00 (L) 22.00 - 41.00 %    Monocytes 7.20 0.00 - 13.40 %    Eosinophils 0.40 0.00 - 6.90 %    Basophils 0.10 0.00 - 1.80 %    Immature Granulocytes 0.30 0.00 - 0.90 %    Nucleated RBC 0.00 /100 WBC    Neutrophils (Absolute) 5.58 2.00 - 7.15 K/uL    Lymphs (Absolute) 1.45 1.00 - 4.80 K/uL    Monos (Absolute) 0.55 0.00 - 0.85 K/uL    Eos (Absolute) 0.03 0.00 - 0.51 K/uL    Baso (Absolute) 0.01 0.00 - 0.12 K/uL    Immature Granulocytes (abs) 0.02 0.00 - 0.11 K/uL    NRBC (Absolute) 0.00 K/uL   CBC without differential    Collection Time: 19 10:09 PM   Result Value Ref Range    WBC 9.4 4.8 - 10.8 K/uL    RBC 3.78 (L) 4.20 - 5.40 M/uL    Hemoglobin 13.1 12.0 - 16.0 g/dL    Hematocrit 38.6 37.0 - 47.0 %    .1 (H) 81.4 - 97.8 fL    MCH 34.7 (H) 27.0 - 33.0 pg    MCHC 33.9 33.6 - 35.0 g/dL    RDW 48.0 35.9 - 50.0 fL    Platelet Count 177 164 - 446 K/uL    MPV 11.0 9.0 - 12.9 fL       Assessment and Plan  1. PP day 1 s/p   2. Breastfeeding  3. stable    Plan  1. Continue routine postpartum care; anticipate discharge PP day 2  2. Discussed breastfeeding in detail with patient. Encouraged water intake, continued prenatal vitamins, and adequate diet. Patient interested in supplementation with formula. Discussed that at this time, it is not indicated. If she desires to do this, encouraged placing infant on breast for nipple stimulation prior to formula feeding. She verbalizes understanding.

## 2019-05-29 NOTE — LACTATION NOTE
This note was copied from a baby's chart.  Mother reports she desires to work on breastfeeding but does plan to use supplements of formula at night or as needed. Educated on risk for decreased milk supply with formula supplements and reviewed supplemental feeding volume guidelines, encouraged breastfeeding prior to bottle feeding as often as mother is willing. Infant sleepy, assisted to hand express at breast, mother declines further breastfeeding assistance at this time, infant skin to skin. Reviewed waking techniques, frequency and duration of feeds, expected infant urine and stool output. To call for more assist as desired. See flow sheet for more details.

## 2019-07-11 ENCOUNTER — OFFICE VISIT (OUTPATIENT)
Dept: URGENT CARE | Facility: CLINIC | Age: 39
End: 2019-07-11
Payer: MEDICAID

## 2019-07-11 VITALS
TEMPERATURE: 97.8 F | BODY MASS INDEX: 42.34 KG/M2 | SYSTOLIC BLOOD PRESSURE: 124 MMHG | DIASTOLIC BLOOD PRESSURE: 78 MMHG | HEART RATE: 71 BPM | RESPIRATION RATE: 16 BRPM | WEIGHT: 248 LBS | HEIGHT: 64 IN | OXYGEN SATURATION: 96 %
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
## 2019-07-11 DIAGNOSIS — S13.4XXA WHIPLASH INJURY TO NECK, INITIAL ENCOUNTER: ICD-10-CM

## 2019-07-11 PROCEDURE — 99203 OFFICE O/P NEW LOW 30 MIN: CPT | Performed by: NURSE PRACTITIONER

## 2019-07-11 ASSESSMENT — ENCOUNTER SYMPTOMS
NECK PAIN: 1
CHILLS: 0
VERTIGO: 0
SHORTNESS OF BREATH: 0
VOMITING: 0
FATIGUE: 0
NUMBNESS: 0
WEAKNESS: 0
DIZZINESS: 0
NAUSEA: 0
EYE PAIN: 0
SORE THROAT: 0
ABDOMINAL PAIN: 0
MYALGIAS: 0
FEVER: 0
HEADACHES: 0
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
## 2019-07-25 ENCOUNTER — POST PARTUM (OUTPATIENT)
Dept: OBGYN | Facility: CLINIC | Age: 39
End: 2019-07-25
Payer: MEDICAID

## 2019-07-25 VITALS — SYSTOLIC BLOOD PRESSURE: 112 MMHG | BODY MASS INDEX: 38.79 KG/M2 | DIASTOLIC BLOOD PRESSURE: 68 MMHG | WEIGHT: 226 LBS

## 2019-07-25 PROBLEM — Z91.89 BREASTFEEDING PROBLEM: Status: RESOLVED | Noted: 2019-05-23 | Resolved: 2019-07-25

## 2019-07-25 PROBLEM — Z3A.18 18 WEEKS GESTATION OF PREGNANCY: Status: RESOLVED | Noted: 2019-01-15 | Resolved: 2019-07-25

## 2019-07-25 PROCEDURE — 0503F POSTPARTUM CARE VISIT: CPT | Performed by: NURSE PRACTITIONER

## 2019-07-25 ASSESSMENT — EDINBURGH POSTNATAL DEPRESSION SCALE (EPDS)
THE THOUGHT OF HARMING MYSELF HAS OCCURRED TO ME: NEVER
I HAVE BEEN ABLE TO LAUGH AND SEE THE FUNNY SIDE OF THINGS: AS MUCH AS I ALWAYS COULD
I HAVE BEEN ANXIOUS OR WORRIED FOR NO GOOD REASON: NO, NOT AT ALL
TOTAL SCORE: 3
I HAVE BLAMED MYSELF UNNECESSARILY WHEN THINGS WENT WRONG: YES, SOME OF THE TIME
I HAVE LOOKED FORWARD WITH ENJOYMENT TO THINGS: AS MUCH AS I EVER DID
I HAVE FELT SCARED OR PANICKY FOR NO GOOD REASON: NO, NOT AT ALL
I HAVE BEEN SO UNHAPPY THAT I HAVE HAD DIFFICULTY SLEEPING: NOT AT ALL
I HAVE BEEN SO UNHAPPY THAT I HAVE BEEN CRYING: ONLY OCCASIONALLY
I HAVE FELT SAD OR MISERABLE: NO, NOT AT ALL
THINGS HAVE BEEN GETTING ON TOP OF ME: NO, I HAVE BEEN COPING AS WELL AS EVER

## 2019-07-25 NOTE — PROGRESS NOTES
Pt here today for postpartum exam.  Delivery type: 5/28/19 Vaginal no complications  Currently : Bottle feeding   Desired BCM: pt would like to discuss birth control options   LMP: pt states she has not had one yet   Last pap: 09/2017 Negative   Phone # [phone]  Pt states no concerns today

## 2019-07-25 NOTE — PATIENT INSTRUCTIONS
Plan   Plan:    1. Encourage SBE.  2. Nammogram @ age 41.  3. Contraceptive counseling - follow up here for further management, info given to pt.  4. Encouraged condom use   5. Discussed diet, exercise and resumption of sexual activity   6. Gave copy of pap , f/u 3yr  7.  F/u c PCP or [facility] clinic as needed for primary care needs.

## 2019-07-25 NOTE — PROGRESS NOTES
Subjective:    Yousuf Guzman is a 38 y.o.  female who presents for her postpartum exam. She had  without complication. Her prenatal course was uncomplicated. She denies dysuria, odor, itching or breast problems. She is bottlefeeding. She is unsure about her birth control method. Reports unprotected sex prior to this appointment.  Denies any S/S of PP depression.     Postpartum care          HPI    Review of Systems   All other systems reviewed and are negative.       Objective:     /68   Wt 102.5 kg (226 lb)   LMP 2018 (Approximate)   BMI 38.79 kg/m²      Physical Exam   Constitutional: She is oriented to person, place, and time. She appears well-developed and well-nourished.   HENT:   Head: Normocephalic and atraumatic.   Eyes:   Eye and ear exam deferred   Neck: Normal range of motion. Neck supple. No thyromegaly present.   Cardiovascular: Normal rate, regular rhythm, normal heart sounds and intact distal pulses.    Pulmonary/Chest: Effort normal and breath sounds normal. Right breast exhibits no inverted nipple, no mass, no nipple discharge, no skin change and no tenderness. Left breast exhibits no inverted nipple, no mass, no nipple discharge, no skin change and no tenderness. Breasts are symmetrical. There is no breast swelling.   Abdominal: Soft. Normal appearance and bowel sounds are normal. There is no CVA tenderness.   Genitourinary: Vagina normal and uterus normal. No breast tenderness, discharge or bleeding. Pelvic exam was performed with patient supine. No labial fusion. There is no rash, tenderness, lesion or injury on the right labia. There is no rash, tenderness, lesion or injury on the left labia. Cervix exhibits no motion tenderness, no discharge and no friability. Right adnexum displays no mass, no tenderness and no fullness. Left adnexum displays no mass, no tenderness and no fullness.   Genitourinary Comments: Repair well healed  Menses   Musculoskeletal:  Normal range of motion.   Neurological: She is alert and oriented to person, place, and time. She has normal reflexes.   Skin: Skin is warm and dry. Capillary refill takes less than 2 seconds.   Psychiatric: She has a normal mood and affect. Her behavior is normal. Judgment and thought content normal.   Nursing note and vitals reviewed.          Assessment   Assessment:    1. PP care   2. Exam WNL   3. Pap WNL     Patient Active Problem List    Diagnosis Date Noted   • Postpartum care and examination of lactating mother 05/29/2019       Plan   Plan:    1. Encourage SBE.  2. Nammogram @ age 41.  3. Contraceptive counseling - follow up here for further management, info given to pt.  4. Encouraged condom use   5. Discussed diet, exercise and resumption of sexual activity   6. Gave copy of pap , f/u 3yr  7.  F/u c PCP or [facility] clinic as needed for primary care needs.     Chaperone offered and provided by Adwoa Santana MA.

## 2020-01-16 ENCOUNTER — GYNECOLOGY VISIT (OUTPATIENT)
Dept: OBGYN | Facility: CLINIC | Age: 40
End: 2020-01-16
Payer: MEDICAID

## 2020-01-16 VITALS — WEIGHT: 232 LBS | BODY MASS INDEX: 39.82 KG/M2 | SYSTOLIC BLOOD PRESSURE: 112 MMHG | DIASTOLIC BLOOD PRESSURE: 62 MMHG

## 2020-01-16 DIAGNOSIS — Z30.09 ENCOUNTER FOR COUNSELING REGARDING CONTRACEPTION: Primary | ICD-10-CM

## 2020-01-16 PROCEDURE — 99212 OFFICE O/P EST SF 10 MIN: CPT | Performed by: NURSE PRACTITIONER

## 2020-01-16 NOTE — PROGRESS NOTES
Pt here for Gyn visit   LMP 1/6/2020  # [phone]  Last pap smear: 9/13/2017 WNL  Pt would like discuss birth control options.

## 2020-01-16 NOTE — PROGRESS NOTES
Chief Complaint   Patient presents with   • Gynecologic Exam       History of present illness: 39 y.o.  presents with above chief complaint. Pt is interested in birth control, unsure of type.  She is currently using nothing.  Birth control methods used in the past are nothing.    PGYNHx: menarche 13, regular, last 4-5 days,   Last pap 17, no hx STDs    Review of systems:  Pertinent positives documented in HPI and all other systems reviewed & are negative    All PMH, PSH, allergies, social history and FH reviewed and updated today:  History reviewed. No pertinent past medical history.    History reviewed. No pertinent surgical history.    Allergies: No Known Allergies    Social History     Socioeconomic History   • Marital status:      Spouse name: Not on file   • Number of children: Not on file   • Years of education: Not on file   • Highest education level: Not on file   Occupational History   • Not on file   Social Needs   • Financial resource strain: Not on file   • Food insecurity:     Worry: Not on file     Inability: Not on file   • Transportation needs:     Medical: Not on file     Non-medical: Not on file   Tobacco Use   • Smoking status: Never Smoker   • Smokeless tobacco: Never Used   Substance and Sexual Activity   • Alcohol use: No   • Drug use: No   • Sexual activity: Yes     Partners: Male     Birth control/protection: Other-See Comments, Implant, Male Sterilization     Comment: Unsure about PP contraception   Lifestyle   • Physical activity:     Days per week: Not on file     Minutes per session: Not on file   • Stress: Not on file   Relationships   • Social connections:     Talks on phone: Not on file     Gets together: Not on file     Attends Sabianism service: Not on file     Active member of club or organization: Not on file     Attends meetings of clubs or organizations: Not on file     Relationship status: Not on file   • Intimate partner violence:     Fear of current or ex  partner: Not on file     Emotionally abused: Not on file     Physically abused: Not on file     Forced sexual activity: Not on file   Other Topics Concern   • Not on file   Social History Narrative   • Not on file       Family History   Problem Relation Age of Onset   • No Known Problems Sister    • No Known Problems Brother    • Dementia Maternal Grandmother        Physical exam:  /62   Wt 105.2 kg (232 lb)     GENERAL APPEARANCE: healthy, alert, no distress, smiling  EXTREMITIES:negative clubbing, cyanosis, edema    NEURO Awake, alert and oriented x 3, Normal gait, no sensory deficits  SKIN No rashes, or ulcers or lesions seen  PSYCHIATRIC: Patient shows appropriate affect, is alert and oriented x3, intact judgment and insight.    Assessment/Plan:  1. Encounter for counseling regarding contraception         Counseling/coordination of care of birth control options including medical and surgical methods. Discussed in detail risk and benefits of OCP, NuvaRing, Patch, Depo, IUDs, Nexplanon as well as the normal side effects of each. Questions answered. Patient would like handouts to read at home prior to making decision.  Handouts provided along with information on [facility] as pt does not have insurance

## 2020-08-18 ENCOUNTER — GYNECOLOGY VISIT (OUTPATIENT)
Dept: OBGYN | Facility: CLINIC | Age: 40
End: 2020-08-18
Payer: MEDICAID

## 2020-08-18 VITALS
BODY MASS INDEX: 42.88 KG/M2 | HEIGHT: 63 IN | DIASTOLIC BLOOD PRESSURE: 80 MMHG | WEIGHT: 242 LBS | SYSTOLIC BLOOD PRESSURE: 120 MMHG

## 2020-08-18 DIAGNOSIS — N93.8 DUB (DYSFUNCTIONAL UTERINE BLEEDING): ICD-10-CM

## 2020-08-18 DIAGNOSIS — N91.1 AMENORRHEA, SECONDARY: ICD-10-CM

## 2020-08-18 DIAGNOSIS — Z32.01 POSITIVE PREGNANCY TEST: ICD-10-CM

## 2020-08-18 PROBLEM — Z30.09 ENCOUNTER FOR COUNSELING REGARDING CONTRACEPTION: Status: RESOLVED | Noted: 2020-01-16 | Resolved: 2020-08-18

## 2020-08-18 LAB
INT CON NEG: NEGATIVE
INT CON POS: POSITIVE
POC URINE PREGNANCY TEST: NORMAL

## 2020-08-18 PROCEDURE — 76830 TRANSVAGINAL US NON-OB: CPT | Performed by: OBSTETRICS & GYNECOLOGY

## 2020-08-18 PROCEDURE — 81025 URINE PREGNANCY TEST: CPT | Performed by: OBSTETRICS & GYNECOLOGY

## 2020-08-18 PROCEDURE — 99213 OFFICE O/P EST LOW 20 MIN: CPT | Mod: 25 | Performed by: OBSTETRICS & GYNECOLOGY

## 2020-08-18 NOTE — PROGRESS NOTES
"Subjective:      Yousuf Guzman is a 39 y.o. female who presents with Gynecologic Exam (DUB)            HPI patient is a 39-year-old  3 para 2 who presents today with amenorrhea and positive clinic pregnancy test.  Patient did not take pregnancy test at home and testing done today in the office was positive.  Patient is very unsure about her last menstrual.  And initially believes her last menstrual bleeding was in January then states possibly April.  She was not using any contraception.  Pregnancy is desired at this point.  Patient is completely asymptomatic.  She is taking prenatal vitamins.    ROS all organ systems are reviewed and are currently negative       Objective:     /80   Ht 1.6 m (5' 3\")   Wt 109.8 kg (242 lb)   LMP 2020   BMI 42.87 kg/m²      Physical Exam  Vitals signs and nursing note reviewed. Exam conducted with a chaperone present.   Constitutional:       General: She is not in acute distress.     Appearance: Normal appearance. She is obese. She is not toxic-appearing.   HENT:      Head: Normocephalic and atraumatic.   Neck:      Musculoskeletal: Normal range of motion and neck supple. No neck rigidity.   Cardiovascular:      Rate and Rhythm: Normal rate and regular rhythm.      Pulses: Normal pulses.      Heart sounds: Normal heart sounds. No murmur. No gallop.    Pulmonary:      Effort: Pulmonary effort is normal. No respiratory distress.      Breath sounds: Normal breath sounds. No wheezing.   Abdominal:      General: Abdomen is flat. Bowel sounds are normal. There is no distension.      Palpations: Abdomen is soft.      Tenderness: There is no abdominal tenderness.   Genitourinary:     General: Normal vulva.   Musculoskeletal: Normal range of motion.      Right lower leg: No edema.      Left lower leg: No edema.   Lymphadenopathy:      Cervical: No cervical adenopathy.   Neurological:      General: No focal deficit present.      Mental Status: She is alert and " oriented to person, place, and time.      Gait: Gait normal.   Psychiatric:         Mood and Affect: Mood normal.         Thought Content: Thought content normal.         Judgment: Judgment normal.            Ultrasound was performed and read by me:    Llanos intrauterine pregnancy noted in variable presentation  Fetal heart rate was 153 bpm  Fetal biometry measurement including head circumference, BPD, abdominal circumference and femur length gives a gestational age of 15 weeks and 4 days  EDC by fetal biometry is 2/5/2021  No adnexal masses noted    Impression: Llanos intrauterine pregnancy at 15 weeks and 4 days gestation with EDC of 2/5/2021.     Assessment/Plan:        1. Amenorrhea, secondary  39-year-old multiparous female presented with amenorrhea and positive home pregnancy test.  Patient is unsure of her last menstrual bleeding.  Normal intrauterine pregnancy confirmed today by ultrasound at 15 weeks and 4 days gestation.  Findings were discussed  Pregnancy precautions and restrictions were reviewed  Patient to continue prenatal vitamin  Patient to follow-up in 1 week for new OB exam      2. Positive pregnancy test in office  POCT Pregnancy    3.  Advanced maternal age discussed-risks were discussed    4.  Precautions and plan of care reviewed.  Patient to follow-up in 1 week for new OB.  She will decide if she desires to proceed any genetic testing in this pregnancy

## 2020-08-18 NOTE — PROGRESS NOTES
Pt here for DUB. Denies N/V,cramping, VB  Good phone# [phone]  Pharmacy verified with pt.   Pt would like BTL if she has C/s

## 2020-08-25 ENCOUNTER — INITIAL PRENATAL (OUTPATIENT)
Dept: OBGYN | Facility: CLINIC | Age: 40
End: 2020-08-25
Payer: MEDICAID

## 2020-08-25 ENCOUNTER — HOSPITAL ENCOUNTER (OUTPATIENT)
Facility: MEDICAL CENTER | Age: 40
End: 2020-08-25
Attending: PHYSICIAN ASSISTANT
Payer: MEDICAID

## 2020-08-25 VITALS
DIASTOLIC BLOOD PRESSURE: 74 MMHG | WEIGHT: 240 LBS | BODY MASS INDEX: 42.52 KG/M2 | HEIGHT: 63 IN | SYSTOLIC BLOOD PRESSURE: 116 MMHG

## 2020-08-25 DIAGNOSIS — O09.522 ADVANCED MATERNAL AGE IN MULTIGRAVIDA, SECOND TRIMESTER: Primary | ICD-10-CM

## 2020-08-25 PROCEDURE — 87591 N.GONORRHOEAE DNA AMP PROB: CPT

## 2020-08-25 PROCEDURE — 87624 HPV HI-RISK TYP POOLED RSLT: CPT

## 2020-08-25 PROCEDURE — 59402 PR NEW OB HIGH RISK: CPT | Performed by: PHYSICIAN ASSISTANT

## 2020-08-25 PROCEDURE — 88175 CYTOPATH C/V AUTO FLUID REDO: CPT

## 2020-08-25 PROCEDURE — 87491 CHLMYD TRACH DNA AMP PROBE: CPT

## 2020-08-25 ASSESSMENT — EDINBURGH POSTNATAL DEPRESSION SCALE (EPDS)
I HAVE FELT SAD OR MISERABLE: NO, NOT AT ALL
I HAVE FELT SCARED OR PANICKY FOR NO GOOD REASON: NO, NOT AT ALL
I HAVE BEEN ANXIOUS OR WORRIED FOR NO GOOD REASON: NO, NOT AT ALL
TOTAL SCORE: 4
THE THOUGHT OF HARMING MYSELF HAS OCCURRED TO ME: NEVER
I HAVE BEEN ABLE TO LAUGH AND SEE THE FUNNY SIDE OF THINGS: AS MUCH AS I ALWAYS COULD
I HAVE BEEN SO UNHAPPY THAT I HAVE HAD DIFFICULTY SLEEPING: NOT AT ALL
I HAVE BEEN SO UNHAPPY THAT I HAVE BEEN CRYING: NO, NEVER
THINGS HAVE BEEN GETTING ON TOP OF ME: YES, MOST OF THE TIME I HAVEN'T BEEN ABLE TO COPE AT ALL
I HAVE BLAMED MYSELF UNNECESSARILY WHEN THINGS WENT WRONG: NOT VERY OFTEN
I HAVE LOOKED FORWARD WITH ENJOYMENT TO THINGS: AS MUCH AS I EVER DID

## 2020-08-25 ASSESSMENT — ENCOUNTER SYMPTOMS
MUSCULOSKELETAL NEGATIVE: 1
DEPRESSION: 0
CONSTITUTIONAL NEGATIVE: 1
RESPIRATORY NEGATIVE: 1
NEUROLOGICAL NEGATIVE: 1
CARDIOVASCULAR NEGATIVE: 1
GASTROINTESTINAL NEGATIVE: 1
EYES NEGATIVE: 1
PSYCHIATRIC NEGATIVE: 1

## 2020-08-25 NOTE — PROGRESS NOTES
"Subjective:      Yousuf Guzman is a 39 y.o. female who presents with New ob visit. Pt sure of LMP and US done at 15wk confirms ERICKA 21. Pt has hx of 2  at term without complications - denies GDM, PIH or delivery complications, though both babies weighed 8 1/2 lbs. Pt denies PMhx, Shx. NKDA. Taking PNV only. Denies tob, etoh or drug use. Pt currently denies cramping, bleeding or pain. +FM.    Pt is AMA but declines PANN referral or AFP. States she will \"keep the baby no matter what.\"         HPI    Review of Systems   Constitutional: Negative.    HENT: Negative.    Eyes: Negative.    Respiratory: Negative.    Cardiovascular: Negative.    Gastrointestinal: Negative.    Genitourinary: Negative.    Musculoskeletal: Negative.    Skin: Negative.    Neurological: Negative.    Endo/Heme/Allergies: Negative.    Psychiatric/Behavioral: Negative.  Negative for depression.   All other systems reviewed and are negative.         Objective:     /74   Ht 1.6 m (5' 3\")   Wt 108.9 kg (240 lb)   LMP 2020   BMI 42.51 kg/m²      Physical Exam  Vitals signs reviewed.   Constitutional:       Appearance: She is well-developed.   HENT:      Head: Normocephalic and atraumatic.   Eyes:      Pupils: Pupils are equal, round, and reactive to light.   Neck:      Musculoskeletal: Normal range of motion and neck supple.      Thyroid: No thyromegaly.   Cardiovascular:      Rate and Rhythm: Normal rate and regular rhythm.      Heart sounds: Normal heart sounds.   Pulmonary:      Effort: Pulmonary effort is normal. No respiratory distress.      Breath sounds: Normal breath sounds.   Abdominal:      General: Bowel sounds are normal. There is no distension.      Palpations: Abdomen is soft.      Tenderness: There is no abdominal tenderness.   Genitourinary:     Exam position: Supine.      Labia:         Right: No rash or tenderness.         Left: No rash or tenderness.       Vagina: Normal. No signs of injury and foreign " body. No vaginal discharge or erythema.      Cervix: No cervical motion tenderness.      Uterus: Enlarged (Gravid, uterus c/w 16-17wk size). Not deviated and not tender.       Adnexa:         Right: No mass or tenderness.          Left: No mass or tenderness.     Skin:     General: Skin is warm and dry.      Findings: No erythema.   Neurological:      Mental Status: She is alert.      Deep Tendon Reflexes: Reflexes are normal and symmetric.   Psychiatric:         Behavior: Behavior normal.         Thought Content: Thought content normal.                 Assessment/Plan:        1. Advanced maternal age in multigravida, second trimester  - F/u 4 wk, US next avail  - Declines AFP or PANN referral  - PREG CNTR PRENATAL PN; Future  - URINE DRUG SCREEN W/CONF (AR); Future  - THINPREP PAP W/HPV AND CTNG; Future  - HEP C VIRUS ANTIBODY; Future  - US-OB 2ND 3RD TRI COMPLETE; Future

## 2020-08-25 NOTE — PROGRESS NOTES
Pt. Here for NOB visit today.  # [phone]  First prenatal care  Pt. States no complaints   Pharmacy verified  Pt declines AFP  Pt declines CF   Chaperone offered and not indicated

## 2020-08-26 DIAGNOSIS — O09.522 ADVANCED MATERNAL AGE IN MULTIGRAVIDA, SECOND TRIMESTER: ICD-10-CM

## 2020-08-26 LAB
C TRACH DNA GENITAL QL NAA+PROBE: NEGATIVE
CYTOLOGY REG CYTOL: NORMAL
HPV HR 12 DNA CVX QL NAA+PROBE: NEGATIVE
HPV16 DNA SPEC QL NAA+PROBE: NEGATIVE
HPV18 DNA SPEC QL NAA+PROBE: NEGATIVE
N GONORRHOEA DNA GENITAL QL NAA+PROBE: NEGATIVE
SPECIMEN SOURCE: NORMAL
SPECIMEN SOURCE: NORMAL

## 2020-09-15 ENCOUNTER — HOSPITAL ENCOUNTER (OUTPATIENT)
Dept: LAB | Facility: MEDICAL CENTER | Age: 40
End: 2020-09-15
Attending: PHYSICIAN ASSISTANT
Payer: MEDICAID

## 2020-09-15 DIAGNOSIS — O09.522 ADVANCED MATERNAL AGE IN MULTIGRAVIDA, SECOND TRIMESTER: ICD-10-CM

## 2020-09-15 LAB
ABO GROUP BLD: NORMAL
AMORPH CRY #/AREA URNS HPF: PRESENT /HPF
APPEARANCE UR: ABNORMAL
BACTERIA #/AREA URNS HPF: NEGATIVE /HPF
BASOPHILS # BLD AUTO: 0.1 % (ref 0–1.8)
BASOPHILS # BLD: 0.01 K/UL (ref 0–0.12)
BILIRUB UR QL STRIP.AUTO: NEGATIVE
BLD GP AB SCN SERPL QL: NORMAL
COLOR UR: YELLOW
EOSINOPHIL # BLD AUTO: 0.07 K/UL (ref 0–0.51)
EOSINOPHIL NFR BLD: 0.9 % (ref 0–6.9)
EPI CELLS #/AREA URNS HPF: NORMAL /HPF
ERYTHROCYTE [DISTWIDTH] IN BLOOD BY AUTOMATED COUNT: 46.9 FL (ref 35.9–50)
GLUCOSE UR STRIP.AUTO-MCNC: NEGATIVE MG/DL
HBV SURFACE AG SER QL: ABNORMAL
HCT VFR BLD AUTO: 38.4 % (ref 37–47)
HCV AB SER QL: NORMAL
HGB BLD-MCNC: 13.2 G/DL (ref 12–16)
HIV 1+2 AB+HIV1 P24 AG SERPL QL IA: NORMAL
HYALINE CASTS #/AREA URNS LPF: NORMAL /LPF
IMM GRANULOCYTES # BLD AUTO: 0.02 K/UL (ref 0–0.11)
IMM GRANULOCYTES NFR BLD AUTO: 0.3 % (ref 0–0.9)
KETONES UR STRIP.AUTO-MCNC: NEGATIVE MG/DL
LEUKOCYTE ESTERASE UR QL STRIP.AUTO: NEGATIVE
LYMPHOCYTES # BLD AUTO: 1.09 K/UL (ref 1–4.8)
LYMPHOCYTES NFR BLD: 14.7 % (ref 22–41)
MCH RBC QN AUTO: 33.8 PG (ref 27–33)
MCHC RBC AUTO-ENTMCNC: 34.4 G/DL (ref 33.6–35)
MCV RBC AUTO: 98.2 FL (ref 81.4–97.8)
MICRO URNS: ABNORMAL
MONOCYTES # BLD AUTO: 0.47 K/UL (ref 0–0.85)
MONOCYTES NFR BLD AUTO: 6.3 % (ref 0–13.4)
NEUTROPHILS # BLD AUTO: 5.75 K/UL (ref 2–7.15)
NEUTROPHILS NFR BLD: 77.7 % (ref 44–72)
NITRITE UR QL STRIP.AUTO: NEGATIVE
NRBC # BLD AUTO: 0 K/UL
NRBC BLD-RTO: 0 /100 WBC
PH UR STRIP.AUTO: 8 [PH] (ref 5–8)
PLATELET # BLD AUTO: 238 K/UL (ref 164–446)
PMV BLD AUTO: 10.4 FL (ref 9–12.9)
PROT UR QL STRIP: NEGATIVE MG/DL
RBC # BLD AUTO: 3.91 M/UL (ref 4.2–5.4)
RBC # URNS HPF: NORMAL /HPF
RBC UR QL AUTO: NEGATIVE
RH BLD: NORMAL
RUBV AB SER QL: 34.1 IU/ML
SP GR UR STRIP.AUTO: 1.02
TREPONEMA PALLIDUM IGG+IGM AB [PRESENCE] IN SERUM OR PLASMA BY IMMUNOASSAY: ABNORMAL
UROBILINOGEN UR STRIP.AUTO-MCNC: 0.2 MG/DL
WBC # BLD AUTO: 7.4 K/UL (ref 4.8–10.8)
WBC #/AREA URNS HPF: NORMAL /HPF

## 2020-09-15 PROCEDURE — 86780 TREPONEMA PALLIDUM: CPT

## 2020-09-15 PROCEDURE — 85025 COMPLETE CBC W/AUTO DIFF WBC: CPT

## 2020-09-15 PROCEDURE — 86850 RBC ANTIBODY SCREEN: CPT

## 2020-09-15 PROCEDURE — 86803 HEPATITIS C AB TEST: CPT

## 2020-09-15 PROCEDURE — 87389 HIV-1 AG W/HIV-1&-2 AB AG IA: CPT

## 2020-09-15 PROCEDURE — 80307 DRUG TEST PRSMV CHEM ANLYZR: CPT

## 2020-09-15 PROCEDURE — 86900 BLOOD TYPING SEROLOGIC ABO: CPT

## 2020-09-15 PROCEDURE — 36415 COLL VENOUS BLD VENIPUNCTURE: CPT

## 2020-09-15 PROCEDURE — 86762 RUBELLA ANTIBODY: CPT

## 2020-09-15 PROCEDURE — 81001 URINALYSIS AUTO W/SCOPE: CPT | Mod: XU

## 2020-09-15 PROCEDURE — 86901 BLOOD TYPING SEROLOGIC RH(D): CPT

## 2020-09-15 PROCEDURE — 87340 HEPATITIS B SURFACE AG IA: CPT

## 2020-09-16 ENCOUNTER — APPOINTMENT (OUTPATIENT)
Dept: RADIOLOGY | Facility: IMAGING CENTER | Age: 40
End: 2020-09-16
Attending: PHYSICIAN ASSISTANT
Payer: MEDICAID

## 2020-09-16 DIAGNOSIS — O09.522 ADVANCED MATERNAL AGE IN MULTIGRAVIDA, SECOND TRIMESTER: ICD-10-CM

## 2020-09-16 PROCEDURE — 76805 OB US >/= 14 WKS SNGL FETUS: CPT | Mod: TC | Performed by: PHYSICIAN ASSISTANT

## 2020-09-17 LAB
AMPHET CTO UR CFM-MCNC: NEGATIVE NG/ML
BARBITURATES CTO UR CFM-MCNC: NEGATIVE NG/ML
BENZODIAZ CTO UR CFM-MCNC: NEGATIVE NG/ML
CANNABINOIDS CTO UR CFM-MCNC: NEGATIVE NG/ML
COCAINE CTO UR CFM-MCNC: NEGATIVE NG/ML
DRUG COMMENT 753798: NORMAL
METHADONE CTO UR CFM-MCNC: NEGATIVE NG/ML
OPIATES CTO UR CFM-MCNC: NEGATIVE NG/ML
PCP CTO UR CFM-MCNC: NEGATIVE NG/ML
PROPOXYPH CTO UR CFM-MCNC: NEGATIVE NG/ML

## 2020-09-24 ENCOUNTER — ROUTINE PRENATAL (OUTPATIENT)
Dept: OBGYN | Facility: CLINIC | Age: 40
End: 2020-09-24
Payer: MEDICAID

## 2020-09-24 VITALS — BODY MASS INDEX: 43.52 KG/M2 | SYSTOLIC BLOOD PRESSURE: 106 MMHG | DIASTOLIC BLOOD PRESSURE: 82 MMHG | WEIGHT: 245.7 LBS

## 2020-09-24 DIAGNOSIS — O09.522 ADVANCED MATERNAL AGE IN MULTIGRAVIDA, SECOND TRIMESTER: ICD-10-CM

## 2020-09-24 PROCEDURE — 90040 PR PRENATAL FOLLOW UP: CPT | Performed by: PHYSICIAN ASSISTANT

## 2020-09-24 PROCEDURE — 90686 IIV4 VACC NO PRSV 0.5 ML IM: CPT | Performed by: PHYSICIAN ASSISTANT

## 2020-09-24 PROCEDURE — 90471 IMMUNIZATION ADMIN: CPT | Performed by: PHYSICIAN ASSISTANT

## 2020-09-24 NOTE — PROGRESS NOTES
OB follow up   Good fetal movement  No VB, LOF or UC's.  Pt states she has rash on her nipples  Flu vaccine given.        Deltoid. Screening checklist reviewed with patient. VIS given. Verified by       Phone # [phone]  Preferred pharmacy confirmed.

## 2020-09-24 NOTE — PROGRESS NOTES
"Pt has no complaints with cramping, bleeding or pain. +FM. Pt has had \"bumps\" on areloa that also occurred with last pregnancy - no erythema, itchiness or pain. Likely hormonal changes with pregnancy, as small, raised, skin colored lesions noted on B areolas. PAP, GC/CT, PNL, US wnl - pt notified of results. 1hr GTT next visit. Flu vaccine given today. RTC 4 wk or sooner prn.   "

## 2020-10-23 ENCOUNTER — ROUTINE PRENATAL (OUTPATIENT)
Dept: OBGYN | Facility: CLINIC | Age: 40
End: 2020-10-23
Payer: MEDICAID

## 2020-10-23 VITALS — WEIGHT: 247 LBS | SYSTOLIC BLOOD PRESSURE: 106 MMHG | BODY MASS INDEX: 43.75 KG/M2 | DIASTOLIC BLOOD PRESSURE: 74 MMHG

## 2020-10-23 DIAGNOSIS — O09.522 ADVANCED MATERNAL AGE IN MULTIGRAVIDA, SECOND TRIMESTER: ICD-10-CM

## 2020-10-23 PROCEDURE — 90040 PR PRENATAL FOLLOW UP: CPT | Performed by: PHYSICIAN ASSISTANT

## 2020-10-23 NOTE — NON-PROVIDER
OB follow up   + fetal movement.  No VB, LOF or UC's.  Flu vaccine offered  Phone # [phone]  Preferred pharmacy confirmed.  3rd trimester lab orders pended and instructions given to patient

## 2020-10-23 NOTE — PROGRESS NOTES
Pt has no complaints with cramping, bleeding or pain. +FM. 1hr GTT, CBC, T pallidium lab slip given today with instructions. TDaP next visit. RTC 4 wk or sooner prn.

## 2020-12-01 ENCOUNTER — ROUTINE PRENATAL (OUTPATIENT)
Dept: OBGYN | Facility: CLINIC | Age: 40
End: 2020-12-01
Payer: MEDICAID

## 2020-12-01 VITALS — SYSTOLIC BLOOD PRESSURE: 112 MMHG | DIASTOLIC BLOOD PRESSURE: 70 MMHG | WEIGHT: 255 LBS | BODY MASS INDEX: 45.17 KG/M2

## 2020-12-01 DIAGNOSIS — O09.522 ADVANCED MATERNAL AGE IN MULTIGRAVIDA, SECOND TRIMESTER: ICD-10-CM

## 2020-12-01 PROCEDURE — 90715 TDAP VACCINE 7 YRS/> IM: CPT | Performed by: PHYSICIAN ASSISTANT

## 2020-12-01 PROCEDURE — 90471 IMMUNIZATION ADMIN: CPT | Performed by: PHYSICIAN ASSISTANT

## 2020-12-01 PROCEDURE — 90040 PR PRENATAL FOLLOW UP: CPT | Performed by: PHYSICIAN ASSISTANT

## 2020-12-01 NOTE — PROGRESS NOTES
Pt here today for OB follow up  Pt states she has irregular lower abdominal cramping   Reports +FM  Good # [phone]  Pharmacy Confirmed.  Chaperone offered and not indicated.   Pt given GRACE sheet and instructions  Pt would like BTL, consent signed   Pt would like TDAP, consent signed   Tdap vaccine given today. RIGHT  Deltoid. VIS given and screening check list reviewed with pt.  Tdap vaccine verified by AM

## 2020-12-01 NOTE — LETTER
Cuente los Movimientos de ames Bebé  Otro paso importante para la marguerite de ames bebé    Yousuf Kyra [facility]            Dept: [phone]    ¿Cuántas semanas tiene de embarazo? 31w3d    Fecha cuando tiene que comenzar a contar el movimiento: 12/1/2020                  Naman debe usar steven diagrama    Prudence manera en que ames doctor puede controlar a marguerite de ames bebé es sabiendo cuantas veces se mueve ames bebé en el útero, o por medio de las “pataditas”.  Usted podrá ayudarle a ames médico al usar cada día el siguiente diagrama.    Cada día, usted debe prestar atención a cuantas horas le lleva a ames bebé moverse 10 veces.  Comience a contar en la mañana, lo antes posible después de haberse levantado.    · Primeramente, escriba la hora en que se mueve ames bebé, hasta llegar a 10 veces.  · Colóquele un check o palomita a cada cuadrito cada vez que ames bebé se mueva hasta que complete 10 veces.  · Escriba la hora cuando termine de contar 10 veces en la última columna.  · Sume el total del tiempo que le llevó contar los 10 movimientos.  · Finalmente, complete el cuadrito de cuantas horas le llevó hacerlo.    Después de pascale contado los 10 movimientos, ya no tendrá que contar los demás movimientos por el amanda del día.  A la mañana siguiente, comience a contar de nuevo cuantas veces se mueve el bebé desde el momento en que se levante.    ¿Qué tendría que considerarse un “movimiento”?  Es difícil de decirlo porque es distinto de prudence madre a otra, y de un embarazo a otro.  Lo importante es que cuente el movimiento de la misma manera tarun el transcurso de ames embarazo.  Si tiene preguntas adicionales, pregúntele a ames doctor.    ¡Cuente cuidadosamente cada día!     MUESTRA:  Semana 28    ¿Cuántas horas le ha llevado sentir 10 movimientos?        Hora de Inicio     1     2     3     4     5     6     7     8     9     10   Hora de Finlizar   Hoda. 8:20 ·  ·  ·  ·  ·  ·  ·  ·  ·  ·  11:40      Mar.               Mié.               Jue.               Vie.               Sáb.               Dom.                 IMPORTANTE:  Usted debe contactar a ames doctor si le lleva más de 2 horas sentir 10 movimientos de ames bebé.    Cada mañana, escriba la hora de inicio y comience a contar los movimientos de ames bebé.  Hágalo colocándole un check o palomita a cada cuadrito cada vez que sienta un movimiento de ames bebé.  Cuando haya sentido 10 “pataditas”, escriba la hora en que terminó de contar en la última columna.  Luego, complete en la cajita (arriba de la haley de check o palomita) el número total de horas que le llevó hacerlo.  Asegúrese de leer completamente las instrucciones en la página anterior.

## 2020-12-01 NOTE — PROGRESS NOTES
Pt has no complaints with cramping, UCs, VB, LOF, though pt has had incr pressure. +FM. Pt did 1hr GTT on 11/16, but it was entered incorrectly at lab, and no record of it. Pt willing to repeat testing, so lab slips given again today. PTL Precautions reviewed. T DaP given today. RTC 2 wk or sooner prn.     *Addendum - pt went to lab and got lab results from [facility], not [facility]. All labs wnl, copy in media. Pt aware of results and no need for 3hr GTT.

## 2020-12-15 ENCOUNTER — ROUTINE PRENATAL (OUTPATIENT)
Dept: OBGYN | Facility: CLINIC | Age: 40
End: 2020-12-15
Payer: MEDICAID

## 2020-12-15 VITALS — WEIGHT: 255.2 LBS | SYSTOLIC BLOOD PRESSURE: 100 MMHG | BODY MASS INDEX: 45.21 KG/M2 | DIASTOLIC BLOOD PRESSURE: 60 MMHG

## 2020-12-15 DIAGNOSIS — O09.893 SUPERVISION OF OTHER HIGH RISK PREGNANCIES, THIRD TRIMESTER: Primary | ICD-10-CM

## 2020-12-15 PROCEDURE — 90040 PR PRENATAL FOLLOW UP: CPT | Performed by: NURSE PRACTITIONER

## 2020-12-15 NOTE — PROGRESS NOTES
S) Pt is a 40 y.o.   at 33w3d  gestation. Routine prenatal care today. No concerns today.    Fetal movement Normal  Cramping no  VB no  LOF no   Denies dysuria. Generally feels well today. Good self-care activities identified. Denies headaches, swelling, visual changes, or epigastric pain .     O) See flow sheet for vital signs and fetal measurements.          Labs: WNL       PNL: WNL       GCT: 139       AFP: Not Examined       GBS: N/A       Pertinent ultrasound - 20 CAITLIN 14.08 anatomy WNL, c/w previous dating           A) IUP at 33w3d       S=D         Patient Active Problem List    Diagnosis Date Noted   • Advanced maternal age in multigravida - Declines AFP, PANN referral 2020          SVE: deferred         TDAP: yes       FLU: yes        BTL: yes       : no       C/S Consent: no       IOL or C/S scheduled: no       LAST PAP:          P) s/s ptl vs general discomforts. Fetal movements reviewed. General ed and anticipatory guidance. Nutrition/exercise/vitamin. Plans breast Plans pp contraception- BTL  Continue PNV.   Discussed NST at 36 weeks and IOL at 39   RTC 2 weeks or PRN.

## 2020-12-15 NOTE — PROGRESS NOTES
OB follow up   + fetal movement. Active  No VB, LOF or UC's.  Flu vaccine offered Received   Phone # [phone]  Preferred pharmacy confirmed.  No complaints as of today

## 2020-12-29 ENCOUNTER — ROUTINE PRENATAL (OUTPATIENT)
Dept: OBGYN | Facility: CLINIC | Age: 40
End: 2020-12-29
Payer: MEDICAID

## 2020-12-29 VITALS — BODY MASS INDEX: 45.56 KG/M2 | WEIGHT: 257.2 LBS | DIASTOLIC BLOOD PRESSURE: 60 MMHG | SYSTOLIC BLOOD PRESSURE: 100 MMHG

## 2020-12-29 DIAGNOSIS — O09.899 SUPERVISION OF OTHER HIGH RISK PREGNANCY, ANTEPARTUM: Primary | ICD-10-CM

## 2020-12-29 PROCEDURE — 90040 PR PRENATAL FOLLOW UP: CPT | Performed by: NURSE PRACTITIONER

## 2020-12-29 NOTE — PROGRESS NOTES
OB follow up   + fetal movement. Active  No VB, LOF or UC's.  Flu vaccine offered Received   Phone # [phone]  Preferred pharmacy confirmed.  C/o  Hip Pain

## 2020-12-29 NOTE — PROGRESS NOTES
S) Pt is a 40 y.o.   at 35w3d  gestation. Routine prenatal care today. No complaints today. GBS next visit. Needs to start weekly NST next week. Patient aware. Labor precautions reviewed, all questions answered.    Fetal movement Normal  Cramping no  VB no  LOF no   Denies dysuria. Generally feels well today. Good self-care activities identified. Denies headaches, swelling, visual changes, or epigastric pain .     O) There were no vitals taken for this visit.        Labs:       PNL: WNL       GCT: 139        AFP: Not Examined       GBS: N/A       Pertinent ultrasound -        20- Survey WNL, CAITLIN 14.08cm, c/w prev dating    A) IUP at 35w3d       S=D         Patient Active Problem List    Diagnosis Date Noted   • Supervision of other high risk pregnancy, antepartum 2020   • Advanced maternal age in multigravida - Declines AFP, PANN referral 2020          SVE: deferred       Chaperone offered: n/a         TDAP: yes       FLU: yes        BTL: yes       : n/a       C/S Consent: n/a       IOL or C/S scheduled: no       LAST PAP: 20- negative         P) s/s ptl vs general discomforts. Fetal movements reviewed. General ed and anticipatory guidance. Nutrition/exercise/vitamin. Plans breast Plans pp contraception- BTL if C/S.  Continue PNV.

## 2021-01-07 ENCOUNTER — HOSPITAL ENCOUNTER (OUTPATIENT)
Facility: MEDICAL CENTER | Age: 41
End: 2021-01-07
Attending: PHYSICIAN ASSISTANT
Payer: MEDICAID

## 2021-01-07 ENCOUNTER — ROUTINE PRENATAL (OUTPATIENT)
Dept: OBGYN | Facility: CLINIC | Age: 41
End: 2021-01-07
Payer: MEDICAID

## 2021-01-07 VITALS — WEIGHT: 258 LBS | SYSTOLIC BLOOD PRESSURE: 112 MMHG | DIASTOLIC BLOOD PRESSURE: 76 MMHG | BODY MASS INDEX: 45.7 KG/M2

## 2021-01-07 DIAGNOSIS — O09.899 SUPERVISION OF OTHER HIGH RISK PREGNANCY, ANTEPARTUM: ICD-10-CM

## 2021-01-07 PROCEDURE — 87081 CULTURE SCREEN ONLY: CPT

## 2021-01-07 PROCEDURE — 87150 DNA/RNA AMPLIFIED PROBE: CPT

## 2021-01-07 PROCEDURE — 90040 PR PRENATAL FOLLOW UP: CPT | Performed by: PHYSICIAN ASSISTANT

## 2021-01-07 NOTE — PROGRESS NOTES
OB follow up   + fetal movement.  No VB, LOF or UC's.  Flu vaccine offered  Phone # [phone]  Preferred pharmacy confirmed.  GBS today

## 2021-01-07 NOTE — PROGRESS NOTES
Pt has no complaints with cramping, UCs, Vb, LOF, though pt has occ lower abd cramping only. +FM. GBS done today. Labor precautions reviewed with pt. Cervix: Cl/50/-3, post, very soft, vtx. RTC 1 wk or sooner prn.

## 2021-01-08 DIAGNOSIS — O09.899 SUPERVISION OF OTHER HIGH RISK PREGNANCY, ANTEPARTUM: ICD-10-CM

## 2021-01-09 PROBLEM — O99.820 GBS (GROUP B STREPTOCOCCUS CARRIER), +RV CULTURE, CURRENTLY PREGNANT: Status: ACTIVE | Noted: 2021-01-09

## 2021-01-09 LAB — GP B STREP DNA SPEC QL NAA+PROBE: POSITIVE

## 2021-01-14 ENCOUNTER — ROUTINE PRENATAL (OUTPATIENT)
Dept: OBGYN | Facility: CLINIC | Age: 41
End: 2021-01-14
Payer: MEDICAID

## 2021-01-14 VITALS — DIASTOLIC BLOOD PRESSURE: 74 MMHG | SYSTOLIC BLOOD PRESSURE: 116 MMHG | BODY MASS INDEX: 46.23 KG/M2 | WEIGHT: 261 LBS

## 2021-01-14 DIAGNOSIS — O09.522 ADVANCED MATERNAL AGE IN MULTIGRAVIDA, SECOND TRIMESTER: ICD-10-CM

## 2021-01-14 DIAGNOSIS — O09.529 ANTEPARTUM MULTIGRAVIDA OF ADVANCED MATERNAL AGE: ICD-10-CM

## 2021-01-14 DIAGNOSIS — Z34.83 ENCOUNTER FOR SUPERVISION OF OTHER NORMAL PREGNANCY, THIRD TRIMESTER: ICD-10-CM

## 2021-01-14 LAB
NST ACOUSTIC STIMULATION: NORMAL
NST ACTION NECESSARY: NORMAL
NST ASSESSMENT: NORMAL
NST BASELINE: NORMAL
NST INDICATIONS: NORMAL
NST OTHER DATA: NORMAL
NST READ BY: NORMAL
NST RETURN: NORMAL
NST UTERINE ACTIVITY: NORMAL

## 2021-01-14 PROCEDURE — 90040 PR PRENATAL FOLLOW UP: CPT | Performed by: ADVANCED PRACTICE MIDWIFE

## 2021-01-14 PROCEDURE — 59025 FETAL NON-STRESS TEST: CPT | Performed by: ADVANCED PRACTICE MIDWIFE

## 2021-01-14 NOTE — PROGRESS NOTES
Has patient been referred to outside provider?  No  Records available on chart?   n/a    Had physician visit? yes  Indication:  AMA    SUBJECTIVE:  Pt is a 40 y.o.   at 37w5d  gestation. Presents today for follow-up prenatal care. Has not been seen in ER or L & D since last visit. Reports good  fetal movement.     Leaking of fluid?       no    Dysuria?       no    Headaches?      no    N/V?          no    Cramping/contractions?      no        OBJECTIVE:   /74   Wt 118.4 kg (261 lb)   LMP 2020   BMI 46.23 kg/m²   Patients' weight gain, fluid intake and exercise level discussed.  Vitals, fundal height , fetal position, and FHR reviewed on flowsheet    Lab:  Recent Results (from the past 336 hour(s))   GRP B STREP, BY PCR (SALGADO BROTH)    Collection Time: 21  2:27 PM    Specimen: Genital   Result Value Ref Range    Strep Gp B DNA PCR POSITIVE (A) Negative       NST  Indication- AMA  FHR: 140  Moderate variability, accels present, no decels  No contractions    Reactive NST per my read.     ASSESSMENT/ PLAN:   - IUP at 37w5d    - S = D   -   Patient Active Problem List    Diagnosis Date Noted   • GBS (group B Streptococcus carrier), +RV culture, currently pregnant 2021   • Supervision of other high risk pregnancy, antepartum 2020   • Advanced maternal age in multigravida - Declines AFP, PANN referral 2020         AMA  NST today reactive. Discussed recommendation for IOL by 40 weeks. IOL request for 2021 placed for patient. Will notify her of date and time at next visit.    NST reactive today.         - S/sx pregnancy and labor warning signs vs general discomforts discussed  - Fetal movements and kick counts reviewed. Adequate hydration reinforced.  - Did discuss current COVID policies related to outpatient and inpatient visits.   - Anticipatory guidance provided.   - RTC in 1 weeks for routine prenatal care.

## 2021-01-14 NOTE — PROGRESS NOTES
Pt here today for OB follow up  Pt states no complaints   Reports +FM  Good # [phone]  Pharmacy Confirmed.  Chaperone offered and not indicated.   GBS positive

## 2021-01-21 ENCOUNTER — ROUTINE PRENATAL (OUTPATIENT)
Dept: OBGYN | Facility: CLINIC | Age: 41
End: 2021-01-21
Payer: MEDICAID

## 2021-01-21 VITALS — SYSTOLIC BLOOD PRESSURE: 118 MMHG | BODY MASS INDEX: 46.66 KG/M2 | DIASTOLIC BLOOD PRESSURE: 76 MMHG | WEIGHT: 263.4 LBS

## 2021-01-21 DIAGNOSIS — O09.93 ENCOUNTER FOR SUPERVISION OF HIGH RISK PREGNANCY IN THIRD TRIMESTER, ANTEPARTUM: Primary | ICD-10-CM

## 2021-01-21 PROCEDURE — 90040 PR PRENATAL FOLLOW UP: CPT | Performed by: NURSE PRACTITIONER

## 2021-01-21 NOTE — PROGRESS NOTES
OB follow up   + fetal movement. Active   No VB, LOF or UC's.  Flu vaccine offered Received   Phone #  [phone]  Preferred pharmacy confirmed.  No complaints as of today

## 2021-01-21 NOTE — PROGRESS NOTES
S:  Pt is  at 38w5d here for routine OB follow up.  No concerns today.  No ED or hospital visits since last seen. Reports good FM.  Denies VB, LOF, RUCs, or vaginal DC.     O:  Please see above vitals.        FHTs: 140        Fundal ht: 38 cm        Fetal position: vertex        SVE: 60/-3        GBS positive  -- reviewed w pt.      A:  IUP at 38w5d  Patient Active Problem List    Diagnosis Date Noted   • GBS (group B Streptococcus carrier), +RV culture, currently pregnant 2021   • Supervision of other high risk pregnancy, antepartum 2020   • Advanced maternal age in multigravida - Declines AFP, PANN referral 2020       P:  1.  Anticipatory guidance given         2.  Labor precautions given.  Instructions given on where to go.  Pt receptive to education.         3.  D/w pt IOL policy.  IOL schedled.        4.  Questions answered.         5.  Encouraged adequate water intake, walking 30-60 min daily, pelvic rocking, birthing ball       6.  F/u  Tomorrow for NST for AMA       7.  IOL on 2021.

## 2021-01-22 ENCOUNTER — ROUTINE PRENATAL (OUTPATIENT)
Dept: OBGYN | Facility: CLINIC | Age: 41
End: 2021-01-22
Payer: MEDICAID

## 2021-01-22 DIAGNOSIS — O09.522 ADVANCED MATERNAL AGE IN MULTIGRAVIDA, SECOND TRIMESTER: ICD-10-CM

## 2021-01-22 PROCEDURE — 59025 FETAL NON-STRESS TEST: CPT | Performed by: OBSTETRICS & GYNECOLOGY

## 2021-01-24 ENCOUNTER — APPOINTMENT (OUTPATIENT)
Dept: OBGYN | Facility: MEDICAL CENTER | Age: 41
End: 2021-01-24
Attending: OBSTETRICS & GYNECOLOGY
Payer: MEDICAID

## 2021-01-24 ENCOUNTER — HOSPITAL ENCOUNTER (OUTPATIENT)
Facility: MEDICAL CENTER | Age: 41
End: 2021-01-24
Attending: OBSTETRICS & GYNECOLOGY | Admitting: OBSTETRICS & GYNECOLOGY
Payer: MEDICAID

## 2021-01-24 VITALS
RESPIRATION RATE: 16 BRPM | SYSTOLIC BLOOD PRESSURE: 117 MMHG | HEIGHT: 63 IN | DIASTOLIC BLOOD PRESSURE: 78 MMHG | TEMPERATURE: 97.8 F | HEART RATE: 82 BPM | BODY MASS INDEX: 46.68 KG/M2 | OXYGEN SATURATION: 96 % | WEIGHT: 263.45 LBS

## 2021-01-24 PROCEDURE — A9270 NON-COVERED ITEM OR SERVICE: HCPCS | Performed by: ADVANCED PRACTICE MIDWIFE

## 2021-01-24 PROCEDURE — 59025 FETAL NON-STRESS TEST: CPT

## 2021-01-24 PROCEDURE — 700102 HCHG RX REV CODE 250 W/ 637 OVERRIDE(OP): Performed by: ADVANCED PRACTICE MIDWIFE

## 2021-01-24 RX ADMIN — DINOPROSTONE 0.5 MG: 0.5 GEL VAGINAL at 22:13

## 2021-01-25 ENCOUNTER — ANESTHESIA EVENT (OUTPATIENT)
Dept: ANESTHESIOLOGY | Facility: MEDICAL CENTER | Age: 41
End: 2021-01-25
Payer: MEDICAID

## 2021-01-25 ENCOUNTER — HOSPITAL ENCOUNTER (INPATIENT)
Facility: MEDICAL CENTER | Age: 41
LOS: 2 days | End: 2021-01-27
Attending: OBSTETRICS & GYNECOLOGY | Admitting: FAMILY MEDICINE
Payer: MEDICAID

## 2021-01-25 ENCOUNTER — APPOINTMENT (OUTPATIENT)
Dept: OBGYN | Facility: MEDICAL CENTER | Age: 41
End: 2021-01-25
Attending: OBSTETRICS & GYNECOLOGY
Payer: MEDICAID

## 2021-01-25 ENCOUNTER — ANESTHESIA (OUTPATIENT)
Dept: ANESTHESIOLOGY | Facility: MEDICAL CENTER | Age: 41
End: 2021-01-25
Payer: MEDICAID

## 2021-01-25 LAB
BASOPHILS # BLD AUTO: 0.2 % (ref 0–1.8)
BASOPHILS # BLD: 0.01 K/UL (ref 0–0.12)
EOSINOPHIL # BLD AUTO: 0.09 K/UL (ref 0–0.51)
EOSINOPHIL NFR BLD: 1.4 % (ref 0–6.9)
ERYTHROCYTE [DISTWIDTH] IN BLOOD BY AUTOMATED COUNT: 47.6 FL (ref 35.9–50)
HCT VFR BLD AUTO: 38.9 % (ref 37–47)
HGB BLD-MCNC: 13.1 G/DL (ref 12–16)
HOLDING TUBE BB 8507: NORMAL
IMM GRANULOCYTES # BLD AUTO: 0.04 K/UL (ref 0–0.11)
IMM GRANULOCYTES NFR BLD AUTO: 0.6 % (ref 0–0.9)
LYMPHOCYTES # BLD AUTO: 1.17 K/UL (ref 1–4.8)
LYMPHOCYTES NFR BLD: 17.6 % (ref 22–41)
MCH RBC QN AUTO: 34.1 PG (ref 27–33)
MCHC RBC AUTO-ENTMCNC: 33.7 G/DL (ref 33.6–35)
MCV RBC AUTO: 101.3 FL (ref 81.4–97.8)
MONOCYTES # BLD AUTO: 0.6 K/UL (ref 0–0.85)
MONOCYTES NFR BLD AUTO: 9 % (ref 0–13.4)
NEUTROPHILS # BLD AUTO: 4.74 K/UL (ref 2–7.15)
NEUTROPHILS NFR BLD: 71.2 % (ref 44–72)
NRBC # BLD AUTO: 0 K/UL
NRBC BLD-RTO: 0 /100 WBC
PLATELET # BLD AUTO: 190 K/UL (ref 164–446)
PMV BLD AUTO: 11.2 FL (ref 9–12.9)
RBC # BLD AUTO: 3.84 M/UL (ref 4.2–5.4)
SARS-COV+SARS-COV-2 AG RESP QL IA.RAPID: NOTDETECTED
SARS-COV-2 RNA RESP QL NAA+PROBE: NOTDETECTED
SPECIMEN SOURCE: NORMAL
SPECIMEN SOURCE: NORMAL
WBC # BLD AUTO: 6.7 K/UL (ref 4.8–10.8)

## 2021-01-25 PROCEDURE — U0003 INFECTIOUS AGENT DETECTION BY NUCLEIC ACID (DNA OR RNA); SEVERE ACUTE RESPIRATORY SYNDROME CORONAVIRUS 2 (SARS-COV-2) (CORONAVIRUS DISEASE [COVID-19]), AMPLIFIED PROBE TECHNIQUE, MAKING USE OF HIGH THROUGHPUT TECHNOLOGIES AS DESCRIBED BY CMS-2020-01-R: HCPCS

## 2021-01-25 PROCEDURE — 3E0P7VZ INTRODUCTION OF HORMONE INTO FEMALE REPRODUCTIVE, VIA NATURAL OR ARTIFICIAL OPENING: ICD-10-PCS | Performed by: OBSTETRICS & GYNECOLOGY

## 2021-01-25 PROCEDURE — 700111 HCHG RX REV CODE 636 W/ 250 OVERRIDE (IP)

## 2021-01-25 PROCEDURE — 3E033VJ INTRODUCTION OF OTHER HORMONE INTO PERIPHERAL VEIN, PERCUTANEOUS APPROACH: ICD-10-PCS | Performed by: OBSTETRICS & GYNECOLOGY

## 2021-01-25 PROCEDURE — 36415 COLL VENOUS BLD VENIPUNCTURE: CPT

## 2021-01-25 PROCEDURE — 59400 OBSTETRICAL CARE: CPT | Performed by: NURSE PRACTITIONER

## 2021-01-25 PROCEDURE — A9270 NON-COVERED ITEM OR SERVICE: HCPCS | Performed by: FAMILY MEDICINE

## 2021-01-25 PROCEDURE — 700105 HCHG RX REV CODE 258: Performed by: NURSE PRACTITIONER

## 2021-01-25 PROCEDURE — 700101 HCHG RX REV CODE 250: Performed by: ANESTHESIOLOGY

## 2021-01-25 PROCEDURE — 770002 HCHG ROOM/CARE - OB PRIVATE (112)

## 2021-01-25 PROCEDURE — U0005 INFEC AGEN DETEC AMPLI PROBE: HCPCS

## 2021-01-25 PROCEDURE — 304965 HCHG RECOVERY SERVICES

## 2021-01-25 PROCEDURE — 59409 OBSTETRICAL CARE: CPT

## 2021-01-25 PROCEDURE — 700111 HCHG RX REV CODE 636 W/ 250 OVERRIDE (IP): Performed by: FAMILY MEDICINE

## 2021-01-25 PROCEDURE — 700105 HCHG RX REV CODE 258: Performed by: FAMILY MEDICINE

## 2021-01-25 PROCEDURE — 700102 HCHG RX REV CODE 250 W/ 637 OVERRIDE(OP): Performed by: FAMILY MEDICINE

## 2021-01-25 PROCEDURE — 85025 COMPLETE CBC W/AUTO DIFF WBC: CPT

## 2021-01-25 PROCEDURE — 87426 SARSCOV CORONAVIRUS AG IA: CPT

## 2021-01-25 RX ORDER — DEXTROSE, SODIUM CHLORIDE, SODIUM LACTATE, POTASSIUM CHLORIDE, AND CALCIUM CHLORIDE 5; .6; .31; .03; .02 G/100ML; G/100ML; G/100ML; G/100ML; G/100ML
INJECTION, SOLUTION INTRAVENOUS CONTINUOUS
Status: DISCONTINUED | OUTPATIENT
Start: 2021-01-25 | End: 2021-01-26

## 2021-01-25 RX ORDER — SODIUM CHLORIDE, SODIUM LACTATE, POTASSIUM CHLORIDE, AND CALCIUM CHLORIDE .6; .31; .03; .02 G/100ML; G/100ML; G/100ML; G/100ML
250 INJECTION, SOLUTION INTRAVENOUS PRN
Status: DISCONTINUED | OUTPATIENT
Start: 2021-01-25 | End: 2021-01-25 | Stop reason: HOSPADM

## 2021-01-25 RX ORDER — ROPIVACAINE HYDROCHLORIDE 2 MG/ML
INJECTION, SOLUTION EPIDURAL; INFILTRATION; PERINEURAL
Status: COMPLETED
Start: 2021-01-25 | End: 2021-01-25

## 2021-01-25 RX ORDER — ACETAMINOPHEN 325 MG/1
650 TABLET ORAL EVERY 4 HOURS PRN
Status: DISCONTINUED | OUTPATIENT
Start: 2021-01-25 | End: 2021-01-27 | Stop reason: HOSPADM

## 2021-01-25 RX ORDER — SODIUM CHLORIDE, SODIUM LACTATE, POTASSIUM CHLORIDE, AND CALCIUM CHLORIDE .6; .31; .03; .02 G/100ML; G/100ML; G/100ML; G/100ML
1000 INJECTION, SOLUTION INTRAVENOUS
Status: DISCONTINUED | OUTPATIENT
Start: 2021-01-25 | End: 2021-01-25 | Stop reason: HOSPADM

## 2021-01-25 RX ORDER — VITAMIN A ACETATE, BETA CAROTENE, ASCORBIC ACID, CHOLECALCIFEROL, .ALPHA.-TOCOPHEROL ACETATE, DL-, THIAMINE MONONITRATE, RIBOFLAVIN, NIACINAMIDE, PYRIDOXINE HYDROCHLORIDE, FOLIC ACID, CYANOCOBALAMIN, CALCIUM CARBONATE, FERROUS FUMARATE, ZINC OXIDE, CUPRIC OXIDE 3080; 12; 120; 400; 1; 1.84; 3; 20; 22; 920; 25; 200; 27; 10; 2 [IU]/1; UG/1; MG/1; [IU]/1; MG/1; MG/1; MG/1; MG/1; MG/1; [IU]/1; MG/1; MG/1; MG/1; MG/1; MG/1
1 TABLET, FILM COATED ORAL
Status: DISCONTINUED | OUTPATIENT
Start: 2021-01-26 | End: 2021-01-27 | Stop reason: HOSPADM

## 2021-01-25 RX ORDER — ROPIVACAINE HYDROCHLORIDE 2 MG/ML
INJECTION, SOLUTION EPIDURAL; INFILTRATION; PERINEURAL CONTINUOUS
Status: DISCONTINUED | OUTPATIENT
Start: 2021-01-25 | End: 2021-01-26

## 2021-01-25 RX ORDER — METHYLERGONOVINE MALEATE 0.2 MG/ML
0.2 INJECTION INTRAVENOUS
Status: DISCONTINUED | OUTPATIENT
Start: 2021-01-25 | End: 2021-01-27 | Stop reason: HOSPADM

## 2021-01-25 RX ORDER — LIDOCAINE HYDROCHLORIDE AND EPINEPHRINE 15; 5 MG/ML; UG/ML
INJECTION, SOLUTION EPIDURAL
Status: COMPLETED | OUTPATIENT
Start: 2021-01-25 | End: 2021-01-25

## 2021-01-25 RX ORDER — CARBOPROST TROMETHAMINE 250 UG/ML
250 INJECTION, SOLUTION INTRAMUSCULAR
Status: DISCONTINUED | OUTPATIENT
Start: 2021-01-25 | End: 2021-01-27 | Stop reason: HOSPADM

## 2021-01-25 RX ORDER — SODIUM CHLORIDE, SODIUM LACTATE, POTASSIUM CHLORIDE, CALCIUM CHLORIDE 600; 310; 30; 20 MG/100ML; MG/100ML; MG/100ML; MG/100ML
INJECTION, SOLUTION INTRAVENOUS CONTINUOUS
Status: ACTIVE | OUTPATIENT
Start: 2021-01-25 | End: 2021-01-26

## 2021-01-25 RX ORDER — ACETAMINOPHEN 325 MG/1
325 TABLET ORAL EVERY 4 HOURS PRN
Status: DISCONTINUED | OUTPATIENT
Start: 2021-01-25 | End: 2021-01-27 | Stop reason: HOSPADM

## 2021-01-25 RX ORDER — MISOPROSTOL 200 UG/1
600 TABLET ORAL
Status: DISCONTINUED | OUTPATIENT
Start: 2021-01-25 | End: 2021-01-27 | Stop reason: HOSPADM

## 2021-01-25 RX ORDER — METHYLERGONOVINE MALEATE 0.2 MG/ML
0.2 INJECTION INTRAVENOUS
Status: DISCONTINUED | OUTPATIENT
Start: 2021-01-25 | End: 2021-01-25 | Stop reason: HOSPADM

## 2021-01-25 RX ORDER — HYDROCODONE BITARTRATE AND ACETAMINOPHEN 5; 325 MG/1; MG/1
2 TABLET ORAL EVERY 4 HOURS PRN
Status: DISCONTINUED | OUTPATIENT
Start: 2021-01-25 | End: 2021-01-27 | Stop reason: HOSPADM

## 2021-01-25 RX ORDER — IBUPROFEN 600 MG/1
600 TABLET ORAL EVERY 6 HOURS PRN
Status: DISCONTINUED | OUTPATIENT
Start: 2021-01-25 | End: 2021-01-27 | Stop reason: HOSPADM

## 2021-01-25 RX ORDER — MISOPROSTOL 200 UG/1
800 TABLET ORAL
Status: DISCONTINUED | OUTPATIENT
Start: 2021-01-25 | End: 2021-01-25 | Stop reason: HOSPADM

## 2021-01-25 RX ORDER — DOCUSATE SODIUM 100 MG/1
100 CAPSULE, LIQUID FILLED ORAL 2 TIMES DAILY PRN
Status: DISCONTINUED | OUTPATIENT
Start: 2021-01-25 | End: 2021-01-27 | Stop reason: HOSPADM

## 2021-01-25 RX ORDER — SODIUM CHLORIDE, SODIUM LACTATE, POTASSIUM CHLORIDE, CALCIUM CHLORIDE 600; 310; 30; 20 MG/100ML; MG/100ML; MG/100ML; MG/100ML
INJECTION, SOLUTION INTRAVENOUS PRN
Status: DISCONTINUED | OUTPATIENT
Start: 2021-01-25 | End: 2021-01-27 | Stop reason: HOSPADM

## 2021-01-25 RX ORDER — CARBOPROST TROMETHAMINE 250 UG/ML
250 INJECTION, SOLUTION INTRAMUSCULAR
Status: DISCONTINUED | OUTPATIENT
Start: 2021-01-25 | End: 2021-01-25 | Stop reason: HOSPADM

## 2021-01-25 RX ADMIN — FENTANYL CITRATE 100 MCG: 50 INJECTION, SOLUTION INTRAMUSCULAR; INTRAVENOUS at 16:29

## 2021-01-25 RX ADMIN — SODIUM CHLORIDE, POTASSIUM CHLORIDE, SODIUM LACTATE AND CALCIUM CHLORIDE: 600; 310; 30; 20 INJECTION, SOLUTION INTRAVENOUS at 14:53

## 2021-01-25 RX ADMIN — SODIUM CHLORIDE, POTASSIUM CHLORIDE, SODIUM LACTATE AND CALCIUM CHLORIDE: 600; 310; 30; 20 INJECTION, SOLUTION INTRAVENOUS at 18:07

## 2021-01-25 RX ADMIN — SODIUM CHLORIDE 5 MILLION UNITS: 900 INJECTION INTRAVENOUS at 10:48

## 2021-01-25 RX ADMIN — MISOPROSTOL 25 MCG: 100 TABLET ORAL at 11:19

## 2021-01-25 RX ADMIN — ROPIVACAINE HYDROCHLORIDE 200 MG: 2 INJECTION, SOLUTION EPIDURAL; INFILTRATION at 17:46

## 2021-01-25 RX ADMIN — ROPIVACAINE HYDROCHLORIDE 200 MG: 2 INJECTION, SOLUTION EPIDURAL; INFILTRATION; PERINEURAL at 17:46

## 2021-01-25 RX ADMIN — SODIUM CHLORIDE, SODIUM LACTATE, POTASSIUM CHLORIDE, CALCIUM CHLORIDE AND DEXTROSE MONOHYDRATE: 5; 600; 310; 30; 20 INJECTION, SOLUTION INTRAVENOUS at 19:54

## 2021-01-25 RX ADMIN — OXYTOCIN 2000 ML/HR: 10 INJECTION, SOLUTION INTRAMUSCULAR; INTRAVENOUS at 21:29

## 2021-01-25 RX ADMIN — PENICILLIN G POTASSIUM 2.5 MILLION UNITS: 20000000 INJECTION, POWDER, FOR SOLUTION INTRAVENOUS at 18:07

## 2021-01-25 RX ADMIN — PENICILLIN G POTASSIUM 2.5 MILLION UNITS: 20000000 INJECTION, POWDER, FOR SOLUTION INTRAVENOUS at 14:53

## 2021-01-25 RX ADMIN — OXYTOCIN 1 MILLI-UNITS/MIN: 10 INJECTION, SOLUTION INTRAMUSCULAR; INTRAVENOUS at 16:22

## 2021-01-25 RX ADMIN — LIDOCAINE HYDROCHLORIDE,EPINEPHRINE BITARTRATE 3 ML: 15; .005 INJECTION, SOLUTION EPIDURAL; INFILTRATION; INTRACAUDAL; PERINEURAL at 17:39

## 2021-01-25 RX ADMIN — OXYTOCIN 125 ML/HR: 10 INJECTION, SOLUTION INTRAMUSCULAR; INTRAVENOUS at 22:48

## 2021-01-25 SDOH — ECONOMIC STABILITY: TRANSPORTATION INSECURITY
IN THE PAST 12 MONTHS, HAS THE LACK OF TRANSPORTATION KEPT YOU FROM MEDICAL APPOINTMENTS OR FROM GETTING MEDICATIONS?: NO

## 2021-01-25 SDOH — ECONOMIC STABILITY: FOOD INSECURITY: WITHIN THE PAST 12 MONTHS, YOU WORRIED THAT YOUR FOOD WOULD RUN OUT BEFORE YOU GOT MONEY TO BUY MORE.: NEVER TRUE

## 2021-01-25 SDOH — ECONOMIC STABILITY: FOOD INSECURITY: WITHIN THE PAST 12 MONTHS, THE FOOD YOU BOUGHT JUST DIDN'T LAST AND YOU DIDN'T HAVE MONEY TO GET MORE.: NEVER TRUE

## 2021-01-25 SDOH — ECONOMIC STABILITY: TRANSPORTATION INSECURITY
IN THE PAST 12 MONTHS, HAS LACK OF TRANSPORTATION KEPT YOU FROM MEETINGS, WORK, OR FROM GETTING THINGS NEEDED FOR DAILY LIVING?: NO

## 2021-01-25 ASSESSMENT — LIFESTYLE VARIABLES
HAVE YOU EVER FELT YOU SHOULD CUT DOWN ON YOUR DRINKING: NO
TOTAL SCORE: 0
EVER FELT BAD OR GUILTY ABOUT YOUR DRINKING: NO
EVER_SMOKED: NEVER
AVERAGE NUMBER OF DAYS PER WEEK YOU HAVE A DRINK CONTAINING ALCOHOL: 0
TOTAL SCORE: 0
HAVE PEOPLE ANNOYED YOU BY CRITICIZING YOUR DRINKING: NO
ALCOHOL_USE: NO
HOW MANY TIMES IN THE PAST YEAR HAVE YOU HAD 5 OR MORE DRINKS IN A DAY: 0
CONSUMPTION TOTAL: INCOMPLETE
TOTAL SCORE: 0
EVER HAD A DRINK FIRST THING IN THE MORNING TO STEADY YOUR NERVES TO GET RID OF A HANGOVER: NO

## 2021-01-25 ASSESSMENT — COPD QUESTIONNAIRES
IN THE PAST 12 MONTHS DO YOU DO LESS THAN YOU USED TO BECAUSE OF YOUR BREATHING PROBLEMS: DISAGREE/UNSURE
HAVE YOU SMOKED AT LEAST 100 CIGARETTES IN YOUR ENTIRE LIFE: NO/DON'T KNOW
DO YOU EVER COUGH UP ANY MUCUS OR PHLEGM?: NO/ONLY WITH OCCASIONAL COLDS OR INFECTIONS
COPD SCREENING SCORE: 0
DURING THE PAST 4 WEEKS HOW MUCH DID YOU FEEL SHORT OF BREATH: NONE/LITTLE OF THE TIME

## 2021-01-25 NOTE — PROGRESS NOTES
2130: pt to labor and delivery, pt here for scheduled op gel.  Pt has an iol at 0900 tomorrow morning.  efm and toco applied. Jamey.  frida ft/60/-2.  Report to cesar diaz cnm.  Prepidil Gel ordered.    2215: prepidil gel placed by rn.  Pt educated.    2300: orders received from cesar diaz cnm to discharge pt home.  Pt to return tomorrow at 9 am for scheduled iol.  Pt educated.  Pt discharged in stable condition.

## 2021-01-25 NOTE — DISCHARGE INSTRUCTIONS
Labor Instructions (37 - 39 weeks):  Call your physician or return to hospital if:  · You have regular contractions that get progressively closer, longer and stronger.  · Your water breaks (remember time and color).  · You have bleeding like a period.  · Your baby does not move enough to complete the daily kick counts (10 movements in 2 hours)  · Your baby moves much less often than on the days before or you have not felt your baby move all day.    For 1/25 induction:  * eat breakfast  * call [phone] at 8 am to confirm appointment

## 2021-01-25 NOTE — H&P
LABOR AND DELIVERY HISTORY AND PHYSICAL    PATIENT ID:  NAME:  Yousuf Guzman  MRN:               [id number]  YOB: 1980    CC:  IOL- AMA    HPI:  Yousuf Guzman is a 40 y.o. female  at 39w2d by a 15 week ultrasound performed on 2020 c/w LMP on Patient's last menstrual period was 2020.. Estimated Date of Delivery: 21  Patient presents complaining of no uterine contractions, with no loss of fluid..  normal fetal movement.  no vaginal bleeding.  Pregnancy was complicated by Obesity and AMA.  Received PG gel last.    ROS: Patient denies any fever chills, nausea, vomiting, headache, chest pain, shortness of breath, or dysuria or unusual swelling of hands or feet.     Prenatal Care: Obtained at [email or web address] 1st visit 2020 with 9 total visits.  Third trimester BPs were approximately 110s/70s.  Total weight gain 10.6 kg during the pregnancy.    Prenatal Labs:   HepBsAg: Neg HIV: Neg Rubella: Imm   RPR: Neg PAP: Neg GBS: Positive   GC/CT: Neg O+ / ABS Neg Quad Screen: unk   1hr GTT: 139 3hr GTT: N/A HepC: Neg     Recent Labs     21  0952   WBC 6.7   RBC 3.84*   HEMOGLOBIN 13.1   HEMATOCRIT 38.9   .3*   MCH 34.1*   RDW 47.6   PLATELETCT 190   MPV 11.2   NEUTSPOLYS 71.20   LYMPHOCYTES 17.60*   MONOCYTES 9.00   EOSINOPHILS 1.40   BASOPHILS 0.20     No results for input(s): SODIUM, POTASSIUM, CHLORIDE, CO2, GLUCOSE, BUN, CPKTOTAL in the last 72 hours.       IMAGING:  OB ultrasound:      2020 1:07 PM     HISTORY/REASON FOR EXAM:  Evaluate fetal anatomy     TECHNIQUE/EXAM DESCRIPTION: OB complete ultrasound.     COMPARISON:  None     FINDINGS:  Fetal Lie:  Vertex  LMP:  2020  Clinical ERICKA by LMP:  2021     Placenta (Location):  Posterior  Placenta Previa: No  Placental Grade: I     Amniotic Fluid Volume:  CAITLIN = 14.08 cm     Fetal Heart Rate:  157 bpm     Cervical Length:  4.51 cm transabdominal     No maternal adnexal mass is identified.     Umbilical  Artery S/D Ratio(s):  Not applicable     Fetal Anatomy  (Seen or Not Seen)  Lateral Ventricles     Seen  Cisterna Magna        Seen  Cerebellum              Seen  CSP             Seen  Orbits             Seen  Face/Lips                Seen  Cord Insertion         Seen  Placental CI         Seen  4 Chamber Heart     Seen  LVOT               Seen  RVOT              Seen  3 Vessel View     Seen  Stomach       Seen  Kidneys                   Seen  Urinary Bladder      Seen  Spine                       Seen  3 Vessel Cord          Seen  Both Upper Extremities    Seen  Both Lower Extremities    Seen  Diaphragm             Seen  Movement       Seen  Gender:  Likely female     Fetal Biometry  BPD    4.27 cm, 18 weeks, 6 days  HC    16.56 cm, 19 weeks, 2 days  AC    14.35 cm, 19 weeks, 5 days  Femur Length    3.09 cm, 19 weeks, 4 days  Humerus Length    2.93 cm, 19 weeks, 4 days  Cerebellum Diameter   1.97 cm     EGA by this US:  19 weeks, 2 days  ERICKA by this US: 2021  ERICKA by 1st US:  2021 by MD     Estimated Fetal Weight:  301 grams  EFW Percentile: 8.0%     Comments:     IMPRESSION:     Single intrauterine pregnancy of an estimated gestational age of 19 weeks, 2 days with an estimated date of delivery of 2021.     No fetal anatomic abnormalities are identified. Size less than dates by 9 days. Interval growth since the prior ultrasound is within normal limits.    POB Hx:  OB History    Para Term  AB Living   3 2 2     2   SAB TAB Ectopic Molar Multiple Live Births             2      # Outcome Date GA Lbr Tato/2nd Weight Sex Delivery Anes PTL Lv   3 Current            2 Term 19 39w3d  3.856 kg (8 lb 8 oz) F Vag-Spont  N LJ   1 Term 18 40w5d  3.92 kg (8 lb 10.3 oz) M Vag-Spont   LJ       PMH/Problem List:    No past medical history on file.  Patient Active Problem List    Diagnosis Date Noted   • GBS (group B Streptococcus carrier), +RV culture, currently pregnant 2021   •  Supervision of other high risk pregnancy, antepartum 12/29/2020   • Advanced maternal age in multigravida - Declines AFP, JOSÉ ANTONION referral 08/25/2020       Current Outpatient Medications:  No current facility-administered medications on file prior to encounter.      Current Outpatient Medications on File Prior to Encounter   Medication Sig Dispense Refill   • Prenatal MV-Min-Fe Fum-FA-DHA (PRENATAL 1 PO) Take  by mouth.         PSH:    No past surgical history on file.    Allergies:   No Known Allergies    SH:  Social History     Socioeconomic History   • Marital status:      Spouse name: Not on file   • Number of children: Not on file   • Years of education: Not on file   • Highest education level: Not on file   Occupational History   • Not on file   Social Needs   • Financial resource strain: Not on file   • Food insecurity     Worry: Never true     Inability: Never true   • Transportation needs     Medical: No     Non-medical: No   Tobacco Use   • Smoking status: Never Smoker   • Smokeless tobacco: Never Used   Substance and Sexual Activity   • Alcohol use: No   • Drug use: No   • Sexual activity: Yes     Partners: Male     Birth control/protection: None     Comment: None    Lifestyle   • Physical activity     Days per week: Not on file     Minutes per session: Not on file   • Stress: Not on file   Relationships   • Social connections     Talks on phone: Not on file     Gets together: Not on file     Attends Advent service: Not on file     Active member of club or organization: Not on file     Attends meetings of clubs or organizations: Not on file     Relationship status: Not on file   • Intimate partner violence     Fear of current or ex partner: Not on file     Emotionally abused: Not on file     Physically abused: Not on file     Forced sexual activity: Not on file   Other Topics Concern   • Not on file   Social History Narrative   • Not on file         PHYSICAL EXAM:  Vitals:    01/25/21 0937   BP:  "116/72   Pulse: 86   Resp: 18   Temp: 36.3 °C (97.4 °F)   TempSrc: Temporal   SpO2: 94%   Weight: 119.5 kg (263 lb 7.2 oz)   Height: 1.6 m (5' 2.99\")     Temp (24hrs), Av.5 °C (97.7 °F), Min:36.3 °C (97.4 °F), Max:36.6 °C (97.8 °F)    General: No acute distress, resting comfortably in bed.  HEENT: normocephalic, nontraumatic, PERRLA, EOMI  Cardiovascular: Heart RRR with no murmurs, rubs or gallops. Distal Pulses 2+  Respiratory: symmetric chest expansion, lungs CTA bilaterally with no wheezes rales or rhonci  Abdomen: gravid, nontender  Musculoskeletal: strength 5/5 in four extremities  Neuro: non focal with no numbness, tingling or changes in sensation    SVE: 1/50%/-3  [place]: Q2-3 minutes;   EFM:  Baseline 135   moderate Variability   Accels to 160   Decels variable    A/P: Intrauterine pregancy at 39w2d weeks in latent labor here for IOL.      Patient Active Problem List    Diagnosis Date Noted   • GBS (group B Streptococcus carrier), +RV culture, currently pregnant 2021   • Supervision of other high risk pregnancy, antepartum 2020   • Advanced maternal age in multigravida - Declines AFP, PANN referral 2020       1. IUP at term- IOL for AMA - cytotec  2. Patient is GBS Positive  3. Anticipating     Armando Andres M.D.        "

## 2021-01-26 LAB
ERYTHROCYTE [DISTWIDTH] IN BLOOD BY AUTOMATED COUNT: 48 FL (ref 35.9–50)
HCT VFR BLD AUTO: 36.9 % (ref 37–47)
HGB BLD-MCNC: 12.5 G/DL (ref 12–16)
MCH RBC QN AUTO: 34.2 PG (ref 27–33)
MCHC RBC AUTO-ENTMCNC: 33.9 G/DL (ref 33.6–35)
MCV RBC AUTO: 101.1 FL (ref 81.4–97.8)
PLATELET # BLD AUTO: 164 K/UL (ref 164–446)
PMV BLD AUTO: 11.5 FL (ref 9–12.9)
RBC # BLD AUTO: 3.65 M/UL (ref 4.2–5.4)
WBC # BLD AUTO: 8.1 K/UL (ref 4.8–10.8)

## 2021-01-26 PROCEDURE — 770002 HCHG ROOM/CARE - OB PRIVATE (112)

## 2021-01-26 PROCEDURE — 700102 HCHG RX REV CODE 250 W/ 637 OVERRIDE(OP): Performed by: FAMILY MEDICINE

## 2021-01-26 PROCEDURE — 303615 HCHG EPIDURAL/SPINAL ANESTHESIA FOR LABOR

## 2021-01-26 PROCEDURE — A9270 NON-COVERED ITEM OR SERVICE: HCPCS | Performed by: FAMILY MEDICINE

## 2021-01-26 PROCEDURE — 85027 COMPLETE CBC AUTOMATED: CPT

## 2021-01-26 PROCEDURE — 36415 COLL VENOUS BLD VENIPUNCTURE: CPT

## 2021-01-26 PROCEDURE — 700102 HCHG RX REV CODE 250 W/ 637 OVERRIDE(OP): Performed by: NURSE PRACTITIONER

## 2021-01-26 PROCEDURE — A9270 NON-COVERED ITEM OR SERVICE: HCPCS | Performed by: NURSE PRACTITIONER

## 2021-01-26 RX ADMIN — ACETAMINOPHEN 650 MG: 325 TABLET ORAL at 21:17

## 2021-01-26 RX ADMIN — IBUPROFEN 600 MG: 600 TABLET, FILM COATED ORAL at 14:19

## 2021-01-26 RX ADMIN — IBUPROFEN 600 MG: 600 TABLET, FILM COATED ORAL at 00:45

## 2021-01-26 RX ADMIN — DOCUSATE SODIUM 100 MG: 100 CAPSULE, LIQUID FILLED ORAL at 00:45

## 2021-01-26 RX ADMIN — IBUPROFEN 600 MG: 600 TABLET, FILM COATED ORAL at 21:17

## 2021-01-26 RX ADMIN — PRENATAL WITH FERROUS FUM AND FOLIC ACID 1 TABLET: 3080; 920; 120; 400; 22; 1.84; 3; 20; 10; 1; 12; 200; 27; 25; 2 TABLET ORAL at 08:24

## 2021-01-26 ASSESSMENT — PAIN DESCRIPTION - PAIN TYPE
TYPE: ACUTE PAIN

## 2021-01-26 ASSESSMENT — EDINBURGH POSTNATAL DEPRESSION SCALE (EPDS)
I HAVE FELT SAD OR MISERABLE: NOT VERY OFTEN
I HAVE BEEN SO UNHAPPY THAT I HAVE BEEN CRYING: ONLY OCCASIONALLY
I HAVE BEEN ANXIOUS OR WORRIED FOR NO GOOD REASON: NO, NOT AT ALL
I HAVE BEEN SO UNHAPPY THAT I HAVE HAD DIFFICULTY SLEEPING: NOT AT ALL
THE THOUGHT OF HARMING MYSELF HAS OCCURRED TO ME: NEVER
I HAVE LOOKED FORWARD WITH ENJOYMENT TO THINGS: AS MUCH AS I EVER DID
THINGS HAVE BEEN GETTING ON TOP OF ME: NO, MOST OF THE TIME I HAVE COPED QUITE WELL
I HAVE BEEN ABLE TO LAUGH AND SEE THE FUNNY SIDE OF THINGS: AS MUCH AS I ALWAYS COULD
I HAVE FELT SCARED OR PANICKY FOR NO GOOD REASON: NO, NOT AT ALL
I HAVE BLAMED MYSELF UNNECESSARILY WHEN THINGS WENT WRONG: NOT VERY OFTEN

## 2021-01-26 ASSESSMENT — PAIN SCALES - GENERAL: PAIN_LEVEL: 3

## 2021-01-26 NOTE — ANESTHESIA TIME REPORT
Anesthesia Start and Stop Event Times     Date Time Event    1/25/2021 1701 Ready for Procedure     1732 Anesthesia Start     2121 Anesthesia Stop        Responsible Staff  01/25/21    Name Role Begin End    Ronna Salazar M.D. Anesth 1732 2121        Preop Diagnosis (Free Text):  Pre-op Diagnosis             Preop Diagnosis (Codes):    Post op Diagnosis  Pain during labor      Premium Reason  A. 3PM - 7AM    Comments:

## 2021-01-26 NOTE — ANESTHESIA PREPROCEDURE EVALUATION
Relevant Problems   No relevant active problems       Physical Exam    Airway   Mallampati: II  TM distance: >3 FB  Neck ROM: full       Cardiovascular - normal exam     Dental - normal exam           Pulmonary   Breath sounds clear to auscultation     Abdominal   (+) obese     Neurological - normal exam               Anesthesia Plan    ASA 3   ASA physical status 3 criteria: morbid obesity - BMI greater than or equal to 40    Plan - epidural   Neuraxial block will be labor analgesia              Pertinent diagnostic labs and testing reviewed    Informed Consent:    Anesthetic plan and risks discussed with patient.

## 2021-01-26 NOTE — ANESTHESIA PROCEDURE NOTES
Epidural Block    Date/Time: 1/25/2021 5:39 PM  Performed by: Ronna Salazar M.D.  Authorized by: Ronna Salazar M.D.     Patient Location:  OB  Start Time:  1/25/2021 5:39 PM  Reason for Block: labor analgesia    patient identified, IV checked, site marked, risks and benefits discussed, surgical consent, monitors and equipment checked, pre-op evaluation and timeout performed    Patient Position:  Sitting  Prep: ChloraPrep, patient draped and sterile technique    Monitoring:  Blood pressure, continuous pulse oximetry and heart rate  Approach:  Midline  Location:  L3-L4  Injection Technique:  EVELIA saline  Skin infiltration:  Lidocaine  Strength:  1%  Dose:  3ml  Needle Type:  Tuohy  Needle Gauge:  17 G  Needle Length:  3.5 in  Loss of resistance::  5.5  Catheter Size:  19 G  Catheter at Skin Depth:  11.5  Test Dose Result:  Negative

## 2021-01-26 NOTE — CARE PLAN
Problem: Altered physiologic condition related to immediate post-delivery state and potential for bleeding/hemorrhage  Goal: Patient physiologically stable as evidenced by normal lochia, palpable uterine involution and vital signs within normal limits  Outcome: PROGRESSING AS EXPECTED     Problem: Alteration in comfort related to episiotomy, vaginal repair and/or after birth pains  Goal: Patient is able to ambulate, care for self and infant  Outcome: PROGRESSING AS EXPECTED  Goal: Patient verbalizes acceptable pain level  Outcome: PROGRESSING AS EXPECTED

## 2021-01-26 NOTE — PROGRESS NOTES
Late Entry    Pt here for IOL.  Has no obstetrical complaints. 1120-Cytotec placed for IOL.  1530-SVE=1-2/60/-2-Dr. Andres updated on exam-poc to place a cooks catheter. 1545-Dr. Andres at the BS to place a cooks catheter.  1622-Oxytocin started after unsuccessful placement of balloon.  Fentanyl given, would like to work towards an epidural.  1700-BS report given to Yen BOYER-pt care assumed.

## 2021-01-26 NOTE — PROGRESS NOTES
"1850- Report received from CHELITA Birmingham RN. POC discussed, all questions answered. Pt comfortabe in bed with epidural. Pt does not want to turn at this time. FOB at bedside.   1905- NNAMDI Costello CNM at bedside to meet pt.   2111-Pt called out stating \"I need my nurse to check my cervix.\"  2114-RN at bedside. Pt states \"I felt pressure and I don't know if my catheter came out or leaked and I peed myself or if my water broke but I am wet down there.\"  2115- RN looks to see and pt is grossly ruptured. She stated it happened a couple of minutes ago.   2116- RN and NA cleaned up pt.   2118- RN SVE, see flowsheets  2120- RN called MERLIN Doherty to see if CNM NNAMDI Costello was out of the OR.   2121- RN called NNAMDI Costello CNM to attend delivery, she is on her way. Pt stating \"the baby is coming, the head is coming.\" RN instructs pt not to push and to waut for CNM to arrive. Pt states \"I am not pushing. But baby is coming, baby is coming.\" RN turned around from making delivery table and calling for help and head was out of vagina. Emergency cord was pulled by VINCENT Oscar and baby was then delivered by this RN. Nuchal X1 loose. See delivery summary  2122- NNAMDI Costello CNM at bedside.   2129- Placenta delivered spontaneously and intact. See delivery summary.   2335- Pt up to BR with RN and NA at side. Steady gait. Unable to void. Karissa care and new gown given.   2345- Pt transferred to PP via wheelchair with viable baby girl in arms. FOB at side.   2355- Report given to Karlee BOYER. POC discussed. All questions answered.         "

## 2021-01-26 NOTE — PROGRESS NOTES
1700 Report received, pt care assumed, pt in bed awaiting epidural, coping well with IV pain medication at this time.   1735 Dr. Salazar at bedside  1736 Epidural time out  1745 Epidural cath placed  1746 Epidural test dose  1755 Epidural continuous pump started.   1850 Report to HENRIQUE Wise RN

## 2021-01-26 NOTE — PROGRESS NOTES
Post Partum Progress Note    Name:   Yousuf Guzman   Date/Time:  1/26/2021 - 6:16 AM  Chief Admitting Dx:  Supervision of high risk pregnancy in third trimester [O09.93]  Labor and delivery, indication for care [O75.9]  Delivery Type:  vaginal, spontaneous  Pregnancy complicated by AMA and GBS positive status  Post-Op/Post Partum Days #:  1    Subjective:  Abdominal pain: no  Ambulating:   yes  Tolerating liquids:  yes  Tolerating food:  yes common adult  Flatus:   no  BM:    no  Bleeding:   without any bleeding  Voiding:   yes  Dizziness:   no  Feeding:   both breast and bottle - Similac with iron    Vitals:    01/25/21 2227 01/25/21 2307 01/25/21 2357 01/26/21 0300   BP: 130/82 132/88 120/74 102/62   Pulse: 80 85 83 70   Resp:   16 16   Temp:   36.6 °C (97.9 °F) 36.4 °C (97.6 °F)   TempSrc:   Temporal Temporal   SpO2:   92% 92%   Weight:       Height:           Exam:  Breast: Tenderness no  Abdomen: Abdomen soft, non-tender. BS normal. No masses,  No organomegaly  Fundal Tenderness:  no  Fundus Firm: yes  Incision: none  Below umbilicus: no  Perineum: perineum intact  Lochia: mild  Extremities: Normal extremities, peripheral pulses and reflexes normal    Meds:  Current Facility-Administered Medications   Medication Dose   • LR infusion     • oxytocin (PITOCIN) infusion (for postpartum)   mL/hr   • ibuprofen (MOTRIN) tablet 600 mg  600 mg   • acetaminophen (Tylenol) tablet 325 mg  325 mg   • acetaminophen (Tylenol) tablet 650 mg  650 mg   • HYDROcodone-acetaminophen (NORCO) 5-325 MG per tablet 2 Tab  2 Tab   • oxytocin (PITOCIN) 20 UNITS/1000ML LR (induction of labor)  0.5-20 luna-units/min   • ropivacaine 0.2 % (NAROPIN) injection     • D5LR infusion     • LR infusion     • tetanus-dipth-acell pertussis (Tdap) inj 0.5 mL  0.5 mL   • measles, mumps and rubella vaccine (MMR) injection 0.5 mL  0.5 mL   • PRN oxytocin (PITOCIN) (20 Units/1000 mL) PRN for excessive uterine bleeding - See Admin Instr   125-999 mL/hr   • miSOPROStol (CYTOTEC) tablet 600 mcg  600 mcg   • methylergonovine (METHERGINE) injection 0.2 mg  0.2 mg   • carboPROST (HEMABATE) injection 250 mcg  250 mcg   • docusate sodium (COLACE) capsule 100 mg  100 mg   • prenatal plus vitamin (STUARTNATAL 1+1) 27-1 MG tablet 1 Tab  1 Tab       Labs:   Recent Labs     01/25/21  0952   WBC 6.7   RBC 3.84*   HEMOGLOBIN 13.1   HEMATOCRIT 38.9   .3*   MCH 34.1*   MCHC 33.7   RDW 47.6   PLATELETCT 190   MPV 11.2       Assessment:  Chief Admitting Dx:  Supervision of high risk pregnancy in third trimester [O09.93]  Labor and delivery, indication for care [O75.9]  Delivery Type:  vaginal, spontaneous  Tubal Ligation:  no    Plan:  Continue routine post partum care.  GBS positive-Plan for discharge on PPD#2    TRACY Reeder.

## 2021-01-26 NOTE — PROGRESS NOTES
Assumed care from L&D. [place] patient to room, call light, emergency light, TV, bed remote. Assessment completed.  Plan of care reviewed; verbalized understanding. Patient reports pain to be tolerable at this time. Instructed to call if needed pain medication.

## 2021-01-26 NOTE — CARE PLAN
Problem: Safety  Goal: Will remain free from injury  Outcome: PROGRESSING AS EXPECTED  Goal: Will remain free from falls  Outcome: PROGRESSING AS EXPECTED     Problem: Infection  Goal: Will remain free from infection  Outcome: PROGRESSING AS EXPECTED  Note: Pt remains free from s/s of infection     Problem: Knowledge Deficit  Goal: Knowledge of disease process/condition, treatment plan, diagnostic tests, and medications will improve  Outcome: PROGRESSING AS EXPECTED  Note: POC discussed with pt, pt verbalizes understanding.      Problem: Pain Management  Goal: Pain level will decrease to patient's comfort goal  Outcome: PROGRESSING AS EXPECTED  Note: IV medication and epidural given for pain management

## 2021-01-26 NOTE — ANESTHESIA POSTPROCEDURE EVALUATION
Patient: Yousuf Guzman    Procedure Summary     Date: 01/25/21 Room / Location:     Anesthesia Start: 1732 Anesthesia Stop: 2121    Procedure: Labor Epidural Diagnosis:     Scheduled Providers:  Responsible Provider: Ronna Salazar M.D.    Anesthesia Type: epidural ASA Status: 3          Final Anesthesia Type: epidural  Last vitals  BP   Blood Pressure: 102/62    Temp   36.4 °C (97.6 °F)    Pulse   Pulse: 70   Resp   16    SpO2   92 %      Anesthesia Post Evaluation    Patient location during evaluation: bedside  Patient participation: complete - patient participated  Level of consciousness: awake and alert  Pain score: 3    Airway patency: patent  Anesthetic complications: no  Cardiovascular status: adequate  Respiratory status: acceptable  Hydration status: acceptable    PONV: none           Nurse Pain Score: 3 (NPRS)

## 2021-01-26 NOTE — PROCEDURES
SPONTANEOUS VAGINAL DELIVERY PROCEDURE NOTE    PATIENT ID:  NAME:  Yousuf Guzman  MRN:               [id number]  YOB: 1980    Labor Course: Patient was admitted to Labor and Delivery at 39w2d for induction of labor due to advanced maternal age.   Pregnancy also complicated by GBS positive status.    IOL begun with prostin gel out pt x 1 dose. Pitocin begun upon admission. PCN x 3 doses. Pt recd epidural for pain management. Labor progressed rapidly following SROM at _ with precipitous nurse delivery. CNM present for placenta delivery.    On 2021  at 21:21, this 40 y.o., now  39w2d , GBS positive female delivered via  under epidural anesthesia a viable female infant weight pending skin to skin transition with APGAR scores of 8 and 9 at one and five minutes. The infant delivered precipitously. There was a single loose nuchal cord which was not  reduced and infant was placed on mothers abdomen by RN in attendance. CNM in room immediately following delivery.  Cord was doubly clamped, cut and infant repositioned to mothers chest. Upon exam, an intact perineum noted. An intact placenta was delivered spontaneously at 21:29 with 3 vessel cord. Estimated blood loss was 300cc. Patient will be transferred to postpartum in stable condition and infant to  nursery.      Delivery of placenta attended by Katie Costello, ZEENAT, APRN.

## 2021-01-26 NOTE — LACTATION NOTE
This note was copied from a baby's chart.  Baby 39.2 weeks, , baby 12 hours old. MOB reports she breast & bottle fed previous (2) babies due to low supply, she desires to breast & bottle this baby. LC asked mother if she pumped with previous babies due to low supply, she stated she had but still did not make more than a few ml's. LC asked if she would like to pump to increase milk supply, MOB declined. MOB chooses to breast & bottle at this time. Supplemental guidelines given with review, mother voiced understanding on volumes to feed after breastfeeding.    Teaching on hunger cues, putting baby to breast when cueing, breastfeed a minimum 8x/24 hours no longer than 4 hours from last feed, then supplement.     Information sheets for OP lactation support & Zoom given.    Breastfeeding plan:  Breastfeed on cue a minimum 8x/24 hours no longer than 4 hours from last feed, supplement according to guidelines volumes.

## 2021-01-27 ENCOUNTER — PHARMACY VISIT (OUTPATIENT)
Dept: PHARMACY | Facility: MEDICAL CENTER | Age: 41
End: 2021-01-27
Payer: COMMERCIAL

## 2021-01-27 VITALS
WEIGHT: 263.45 LBS | HEART RATE: 76 BPM | HEIGHT: 63 IN | BODY MASS INDEX: 46.68 KG/M2 | OXYGEN SATURATION: 98 % | SYSTOLIC BLOOD PRESSURE: 116 MMHG | RESPIRATION RATE: 17 BRPM | DIASTOLIC BLOOD PRESSURE: 80 MMHG | TEMPERATURE: 97 F

## 2021-01-27 PROCEDURE — RXMED WILLOW AMBULATORY MEDICATION CHARGE: Performed by: PHYSICIAN ASSISTANT

## 2021-01-27 PROCEDURE — 700102 HCHG RX REV CODE 250 W/ 637 OVERRIDE(OP): Performed by: NURSE PRACTITIONER

## 2021-01-27 PROCEDURE — 700102 HCHG RX REV CODE 250 W/ 637 OVERRIDE(OP): Performed by: FAMILY MEDICINE

## 2021-01-27 PROCEDURE — A9270 NON-COVERED ITEM OR SERVICE: HCPCS | Performed by: FAMILY MEDICINE

## 2021-01-27 PROCEDURE — A9270 NON-COVERED ITEM OR SERVICE: HCPCS | Performed by: NURSE PRACTITIONER

## 2021-01-27 RX ORDER — IBUPROFEN 600 MG/1
600 TABLET ORAL EVERY 6 HOURS PRN
Qty: 30 TAB | Refills: 0 | Status: SHIPPED | OUTPATIENT
Start: 2021-01-27

## 2021-01-27 RX ORDER — PSEUDOEPHEDRINE HCL 30 MG
100 TABLET ORAL 2 TIMES DAILY PRN
Qty: 60 CAP | Refills: 0 | Status: SHIPPED | OUTPATIENT
Start: 2021-01-27

## 2021-01-27 RX ADMIN — IBUPROFEN 600 MG: 600 TABLET, FILM COATED ORAL at 03:36

## 2021-01-27 RX ADMIN — PRENATAL WITH FERROUS FUM AND FOLIC ACID 1 TABLET: 3080; 920; 120; 400; 22; 1.84; 3; 20; 10; 1; 12; 200; 27; 25; 2 TABLET ORAL at 11:42

## 2021-01-27 ASSESSMENT — PAIN DESCRIPTION - PAIN TYPE: TYPE: ACUTE PAIN

## 2021-01-27 NOTE — CARE PLAN
Problem: Altered physiologic condition related to immediate post-delivery state and potential for bleeding/hemorrhage  Goal: Patient physiologically stable as evidenced by normal lochia, palpable uterine involution and vital signs within normal limits  Outcome: PROGRESSING AS EXPECTED     Problem: Alteration in comfort related to episiotomy, vaginal repair and/or after birth pains  Goal: Patient is able to ambulate, care for self and infant  Outcome: PROGRESSING AS EXPECTED     Problem: Potential knowledge deficit related to lack of understanding of self and  care  Goal: Patient will demonstrate ability to care for self and infant  Outcome: PROGRESSING AS EXPECTED

## 2021-01-27 NOTE — LACTATION NOTE
This note was copied from a baby's chart.  Follow-up visit. Mother states she plans to do both breast and bottle feeding, as she has done with her other children. Offered her WIC sign up information, she declined. She states she has a pump at home, but it is older. She is requesting a manual pump. Talked about pump rental, if she doesn't have a pump. Provided pump rental information. Offered latch assistance, she declined at this time.    Plan is to breastfeed on cue, with at least 8 or more feedings in a 24 hour period, supplement with formula per guidelines, and use pump to help establish supply.    Outpatient lactation information was provided.    No other lactation questions or concerns at this time.

## 2021-01-27 NOTE — CARE PLAN
Problem: Potential for postpartum infection related to presence of episiotomy/vaginal tear and/or uterine contamination  Goal: Patient will be absent from signs and symptoms of infection  Outcome: PROGRESSING AS EXPECTED   No signs r symptoms of infection noted  Problem: Alteration in comfort related to episiotomy, vaginal repair and/or after birth pains  Goal: Patient is able to ambulate, care for self and infant  Outcome: PROGRESSING AS EXPECTED   Taking pain pills with adequate relief

## 2021-01-27 NOTE — DISCHARGE INSTRUCTIONS
"·   · You can also text them, text \"ANSWER\" to [phone]    QUIT SMOKING/TOBACCO USE:  I understand the use of any tobacco products increases my chance of suffering from future heart disease and could cause other illnesses which may shorten my life. Quitting the use of tobacco products is the single most important thing I can do to improve my health. For further information on smoking / tobacco cessation call a Toll Free Quit Line at 1-916.177.5107 (*National [employer] [place]) or 1-503.976.9059 (American Lung Association) or you can access the web based program at www.lungusa.org.    · [place] Tobacco Users Help Line:  (171) 371-9110       Toll Free: 1-857.596.4506  · Quit Tobacco Program [facility] (170)285-8162    DEPRESSION / SUICIDE RISK:  As you are discharged from this [facility], it is important to learn how to keep safe from harming yourself.    Recognize the warning signs:  · Abrupt changes in personality, positive or negative- including increase in energy   · Giving away possessions  · Change in eating patterns- significant weight changes-  positive or negative  · Change in sleeping patterns- unable to sleep or sleeping all the time   · Unwillingness or inability to communicate  · Depression  · Unusual sadness, discouragement and loneliness  · Talk of wanting to die  · Neglect of personal appearance   · Rebelliousness- reckless behavior  · Withdrawal from people/activities they love  · Confusion- inability to concentrate     If you or a loved one observes any of these behaviors or has concerns about self-harm, here's what you can do:  · Talk about it- your feelings and reasons for harming yourself  · Remove any means that you might use to hurt yourself (examples: pills, rope, extension cords, firearm)  · Get professional help from the community (Mental Health, Substance Abuse, psychological counseling)  · Do not be alone:Call your Safe Contact- someone whom you trust who will " be there for you.  · Call your local CRISIS HOTLINE 788-9669 or 583-022-5496  · Call your local Children's Mobile Crisis Response Team [place] (108) 104-1694 or www.[employer]  · Call the toll free National Suicide Prevention Hotlines   · National Suicide Prevention Lifeline 095-113-DEVI (0705)  · National Hope Line Network 800-SUICIDE (460-8191)    DISCHARGE SURVEY:  Thank you for choosing [facility].  We hope we provided you with very good care.  You may be receiving a survey in the mail.  Please fill it out.  Your opinion is valuable to us.    ADDITIONAL EDUCATIONAL MATERIALS GIVEN TO PATIENT:        My signature on this form indicates that:  1.  I have reviewed and understand the above information  2.  My questions regarding this information have been answered to my satisfaction.  3.  I have formulated a plan with my discharge nurse to obtain my prescribed medication for home.

## 2021-01-27 NOTE — DISCHARGE PLANNING
Meds-to-Beds: Discharge prescription orders listed below delivered to patient's bedside. RN notified. Patient counseled.       Yousuf Lester   Home Medication Instructions KIN:[id number]    Printed on:01/27/21 1050   Medication Information                      docusate sodium 100 MG Cap  Take 1 capsule by mouth 2 times a day as needed for Constipation.             ibuprofen (MOTRIN) 600 MG Tab  Take 1 Tablet by mouth every 6 hours as needed for cramping after delivery.                 Mandy Guzman, Pharmacy Intern

## 2021-01-27 NOTE — PROGRESS NOTES
Report received from Leslee Vera RN and assumed care of patient. POC discussed, all questions answered, and rounding in place.

## 2021-01-27 NOTE — DISCHARGE SUMMARY
Discharge Summary:      Yousuf Guzman    Admit Date:   2021  Discharge Date:  2021     Admitting diagnosis:  Supervision of high risk pregnancy in third trimester [O09.93]  Labor and delivery, indication for care [O75.9]  Discharge Diagnosis: Status post vaginal, spontaneous.  Pregnancy Complications: group B strep (untreated), AMA  Tubal Ligation:  no        History:  No past medical history on file.  OB History    Para Term  AB Living   3 3 3     3   SAB TAB Ectopic Molar Multiple Live Births           0 3      # Outcome Date GA Lbr Tato/2nd Weight Sex Delivery Anes PTL Lv   3 Term 21 39w2d / 00:03 3.11 kg (6 lb 13.7 oz) F Vag-Spont EPI N LJ      Complications: Advanced maternal age in multigravida, third trimester   2 Term 19 39w3d  3.856 kg (8 lb 8 oz) F Vag-Spont  N LJ   1 Term 18 40w5d  3.92 kg (8 lb 10.3 oz) M Vag-Spont   LJ        Patient has no known allergies.  Patient Active Problem List    Diagnosis Date Noted   • GBS (group B Streptococcus carrier), +RV culture, currently pregnant 2021   • Supervision of other high risk pregnancy, antepartum 2020   • Advanced maternal age [facility]        Hospital Course:   40 y.o. , now para 3, was admitted with the above mentioned diagnosis, underwent Active Labor, vaginal, spontaneous. Patient postpartum course was unremarkable, with progressive advancement in diet , ambulation and toleration of oral analgesia. Patient without complaints today and desires discharge.      Vitals:    21 0300 21 0637 21 1746 21 0600   BP: 102/62 124/77 131/82 116/80   Pulse: 70 70 80 76   Resp: 16 16 18 17   Temp: 36.4 °C (97.6 °F) 36.3 °C (97.4 °F) 36.6 °C (97.9 °F) 36.1 °C (97 °F)   TempSrc: Temporal Temporal Temporal Temporal   SpO2: 92% 95% 94% 98%   Weight:       Height:           Current Facility-Administered Medications   Medication Dose    • oxytocin (PITOCIN) infusion (for postpartum)   mL/hr   • ibuprofen (MOTRIN) tablet 600 mg  600 mg   • acetaminophen (Tylenol) tablet 325 mg  325 mg   • acetaminophen (Tylenol) tablet 650 mg  650 mg   • HYDROcodone-acetaminophen (NORCO) 5-325 MG per tablet 2 Tab  2 Tab   • LR infusion     • tetanus-dipth-acell pertussis (Tdap) inj 0.5 mL  0.5 mL   • measles, mumps and rubella vaccine (MMR) injection 0.5 mL  0.5 mL   • PRN oxytocin (PITOCIN) (20 Units/1000 mL) PRN for excessive uterine bleeding - See Admin Instr  125-999 mL/hr   • miSOPROStol (CYTOTEC) tablet 600 mcg  600 mcg   • methylergonovine (METHERGINE) injection 0.2 mg  0.2 mg   • carboPROST (HEMABATE) injection 250 mcg  250 mcg   • docusate sodium (COLACE) capsule 100 mg  100 mg   • prenatal plus vitamin (STUARTNATAL 1+1) 27-1 MG tablet 1 Tab  1 Tab       Exam:  Breast Exam: negative  Abdomen: Abdomen soft, non-tender. BS normal. No masses,  No organomegaly  Fundus Non Tender: yes  Incision: none  Perineum: perineum intact  Extremity: extremities, peripheral pulses and reflexes normal     Labs:  Recent Labs     01/25/21  0952 01/26/21  0655   WBC 6.7 8.1   RBC 3.84* 3.65*   HEMOGLOBIN 13.1 12.5   HEMATOCRIT 38.9 36.9*   .3* 101.1*   MCH 34.1* 34.2*   MCHC 33.7 33.9   RDW 47.6 48.0   PLATELETCT 190 164   MPV 11.2 11.5        Activity:   Discharge to home  Pelvic Rest x 6 weeks    Assessment:  normal postpartum course  Discharge Assessment: No areas of skin breakdown/redness; surgical incision intact/healing     Follow up: .[facility] [place] or [facility] in 5 weeks for vaginal ; 1 week for incision check.      Discharge Meds:   Current Outpatient Medications   Medication Sig Dispense Refill   • docusate sodium 100 MG Cap Take 100 mg by mouth 2 times a day as needed for Constipation. 60 Cap 0   • ibuprofen (MOTRIN) 600 MG Tab Take 1 Tab by mouth every 6 hours as needed (For cramping after delivery; do not give if patient is receiving ketorolac  (Toradol)). 30 Tab 0       MIKEL Pickering.

## 2021-03-05 ENCOUNTER — POST PARTUM (OUTPATIENT)
Dept: OBGYN | Facility: CLINIC | Age: 41
End: 2021-03-05
Payer: MEDICAID

## 2021-03-05 VITALS — BODY MASS INDEX: 42.58 KG/M2 | SYSTOLIC BLOOD PRESSURE: 110 MMHG | DIASTOLIC BLOOD PRESSURE: 64 MMHG | WEIGHT: 240.3 LBS

## 2021-03-05 DIAGNOSIS — Z30.2 ENCOUNTER FOR STERILIZATION: ICD-10-CM

## 2021-03-05 PROBLEM — O99.820 GBS (GROUP B STREPTOCOCCUS CARRIER), +RV CULTURE, CURRENTLY PREGNANT: Status: RESOLVED | Noted: 2021-01-09 | Resolved: 2021-03-05

## 2021-03-05 PROBLEM — O09.522 ADVANCED MATERNAL AGE IN MULTIGRAVIDA, SECOND TRIMESTER: Status: RESOLVED | Noted: 2020-08-25 | Resolved: 2021-03-05

## 2021-03-05 PROBLEM — O09.899 SUPERVISION OF OTHER HIGH RISK PREGNANCY, ANTEPARTUM: Status: RESOLVED | Noted: 2020-12-29 | Resolved: 2021-03-05

## 2021-03-05 PROCEDURE — 0503F POSTPARTUM CARE VISIT: CPT | Performed by: PHYSICIAN ASSISTANT

## 2021-03-05 ASSESSMENT — EDINBURGH POSTNATAL DEPRESSION SCALE (EPDS)
I HAVE LOOKED FORWARD WITH ENJOYMENT TO THINGS: AS MUCH AS I EVER DID
I HAVE BEEN SO UNHAPPY THAT I HAVE BEEN CRYING: NO, NEVER
I HAVE BEEN SO UNHAPPY THAT I HAVE HAD DIFFICULTY SLEEPING: NOT AT ALL
I HAVE BLAMED MYSELF UNNECESSARILY WHEN THINGS WENT WRONG: NOT VERY OFTEN
I HAVE BEEN ANXIOUS OR WORRIED FOR NO GOOD REASON: NO, NOT AT ALL
THE THOUGHT OF HARMING MYSELF HAS OCCURRED TO ME: NEVER
I HAVE BEEN ABLE TO LAUGH AND SEE THE FUNNY SIDE OF THINGS: AS MUCH AS I ALWAYS COULD
THINGS HAVE BEEN GETTING ON TOP OF ME: NO, I HAVE BEEN COPING AS WELL AS EVER
TOTAL SCORE: 1
I HAVE FELT SCARED OR PANICKY FOR NO GOOD REASON: NO, NOT AT ALL
I HAVE FELT SAD OR MISERABLE: NO, NOT AT ALL

## 2021-03-05 ASSESSMENT — ENCOUNTER SYMPTOMS
CARDIOVASCULAR NEGATIVE: 1
NEUROLOGICAL NEGATIVE: 1
MUSCULOSKELETAL NEGATIVE: 1
EYES NEGATIVE: 1
CONSTITUTIONAL NEGATIVE: 1
DEPRESSION: 0
PSYCHIATRIC NEGATIVE: 1
RESPIRATORY NEGATIVE: 1
GASTROINTESTINAL NEGATIVE: 1

## 2021-03-05 NOTE — PROGRESS NOTES
Pt here today for postpartum exam,delivery type vaginal 01/25/2021  Currently bottle feeding  BCM: Pt wants tubal, [facility].   LMP: 03/05/2021  Good ph:[phone]  Last pap smear 08/25/2020 WNL  Chaperone offered and not indicated

## 2021-03-06 NOTE — PROGRESS NOTES
Subjective:      Yousuf Guzman is a 40 y.o. female who presents with Postpartum visit today. 5wk s/p precipitous delivery/ without complications.  Pt has no complaints- denies heavy vaginal bleeding, depression, unprotected intercourse, pain or BF. PAP wnl  - repeat . BCM desired is BTL - consent signed  - pt to see MD for preop appt and will get surgery scheduled as soon after as possible.            HPI    Review of Systems   Constitutional: Negative.    HENT: Negative.    Eyes: Negative.    Respiratory: Negative.    Cardiovascular: Negative.    Gastrointestinal: Negative.    Genitourinary: Negative.    Musculoskeletal: Negative.    Skin: Negative.    Neurological: Negative.    Endo/Heme/Allergies: Negative.    Psychiatric/Behavioral: Negative.  Negative for depression.   All other systems reviewed and are negative.         Objective:     /64   Wt 109 kg (240 lb 4.8 oz)   LMP 2020   BMI 42.58 kg/m²      Physical Exam  Vitals reviewed.   Constitutional:       Appearance: She is well-developed.   HENT:      Head: Normocephalic and atraumatic.   Eyes:      Pupils: Pupils are equal, round, and reactive to light.   Neck:      Thyroid: No thyromegaly.   Cardiovascular:      Rate and Rhythm: Normal rate and regular rhythm.      Heart sounds: Normal heart sounds.   Pulmonary:      Effort: Pulmonary effort is normal. No respiratory distress.      Breath sounds: Normal breath sounds.   Abdominal:      General: Bowel sounds are normal. There is no distension.      Palpations: Abdomen is soft.      Tenderness: There is no abdominal tenderness.          Comments: Pt has heat rash on B lower abd under pannus.    Genitourinary:     Exam position: Supine.      Labia:         Right: No rash or tenderness.         Left: No rash or tenderness.       Vagina: Normal. No signs of injury and foreign body. No vaginal discharge or erythema.      Cervix: No cervical motion tenderness.      Uterus: Not  deviated, not enlarged and not tender.       Adnexa:         Right: No mass or tenderness.          Left: No mass or tenderness.     Musculoskeletal:      Cervical back: Normal range of motion and neck supple.   Skin:     General: Skin is warm and dry.      Findings: No erythema.   Neurological:      Mental Status: She is alert.      Deep Tendon Reflexes: Reflexes are normal and symmetric.   Psychiatric:         Behavior: Behavior normal.         Thought Content: Thought content normal.                 Assessment/Plan:        1. Postpartum care following vaginal delivery  - Pt to see MDs in 1-2 wk for Preop for BTL - referral placed but to be scheduled after MD sees pt  - PAP wnl 8/20 - repeat 5 years    2. Encounter for sterilization  - To see MD asap, BTL consent signed 12/20

## 2021-03-09 ENCOUNTER — GYNECOLOGY VISIT (OUTPATIENT)
Dept: OBGYN | Facility: CLINIC | Age: 41
End: 2021-03-09
Payer: MEDICAID

## 2021-03-09 VITALS — SYSTOLIC BLOOD PRESSURE: 110 MMHG | DIASTOLIC BLOOD PRESSURE: 80 MMHG | WEIGHT: 240 LBS | BODY MASS INDEX: 42.52 KG/M2

## 2021-03-09 DIAGNOSIS — Z30.017 NEXPLANON INSERTION: ICD-10-CM

## 2021-03-09 PROCEDURE — 11981 INSERTION DRUG DLVR IMPLANT: CPT | Performed by: OBSTETRICS & GYNECOLOGY

## 2021-03-09 NOTE — PROGRESS NOTES
"GYN Visit    CC: Discuss contraception    HPI: Yousuf Guzman is a 40 y.o.  to discuss contraception.  Patient reports she is sure she does not desire future childbearing, thought that she was interested in permanent sterilization with getting her tubes tied although does ask if the tubes can be untied today.    Previously used ESTHER and had difficulty remembering to take them.  Its that her partner is unwilling to consider vasectomy.    Reports menses are regular, monthly lasting 3 days.  Not heavy or painful.    ROS:  constitutional: denies fevers, general concerns  : denies irregular vaginal bleeding, discharge, pain  Psych: reports no concerns about mood, feels well    OB History    Para Term  AB Living   3 3 3     3   SAB TAB Ectopic Molar Multiple Live Births           0 3      # Outcome Date GA Lbr Tato/2nd Weight Sex Delivery Anes PTL Lv   3 Term 21 39w2d / 00:03 3.11 kg (6 lb 13.7 oz) F Vag-Spont EPI N LJ      Complications: Advanced maternal age in multigravida, third trimester   2 Term 19 39w3d  3.856 kg (8 lb 8 oz) F Vag-Spont  N LJ   1 Term 18 40w5d  3.92 kg (8 lb 10.3 oz) M Vag-Spont   LJ       PMHx: denies  PSHx: denies      Medications:   none    Allergies: Patient has no known allergies.    Social History     Tobacco Use   • Smoking status: Never Smoker   • Smokeless tobacco: Never Used   Substance Use Topics   • Alcohol use: No   • Drug use: No             Physical Exam:  /80 (BP Location: Left arm, Patient Position: Sitting)   Wt 109 kg (240 lb)   LMP 2021   Breastfeeding No   BMI 42.52 kg/m²   gen: AAO, NAD, affect appropriate      A/P: 40 y.o.  here to discuss contraception.  No diagnosis found.    Discuss permanent sterilization is permanent and irreversible, discussed that the tubes cannot be \"untied.\"  Surgical procedure and how the laparoscopic procedures for performed, discussed recommendation for tubal removal when we " are doing permanent sterilization.  Discussed that vasectomy is a lower risk alternate procedure to be considered when a couple desires permanent sterilization but patient reports her partner is unwilling to consider vasectomy.  We did discuss that laparoscopic bilateral salpingectomy is a very low risk surgery compared to other surgeries but that all surgeries do have risks.    We also discussed alternate contraceptive options with the most effective being long-acting reversible contraception.  Discussed Nexplanon, Mirena, ParaGard IUD and patient offered placement today of her desired device.  Patient today desires Nexplanon placement -see separate procedure note for details.  Discussed the risk of irregular or absent menses with this device - to expect at least initially.  Also expect some discomfort and bruising at the insertion site, to take Tylenol or ibuprofen as needed.  Pressure dressing to be removed in several hours or prior if uncomfortable. Risk of pregnancy (discussed 99.9% effective at pregnancy prevention) reviewed.  Risk of pain/bleedign/infection with insertion.  All questions answered, consents signed.        Ada Gupta MD  [facility] [place] [facility], Women's Health

## 2021-03-09 NOTE — PROCEDURES
Procedures      Procedure Note : Nexplanon Insertion      UPT neg    History reviewed. No pertinent past medical history.  Allergies: Patient has no known allergies.       ROS:   Gen: denies concerns  : denies concerns        Nexplanon Insertion  Pt is right handed  Left upper extremity positioned so the medial aspect exposed.  Inferior to the groove between the biceps/triceps, over the triceps, and approximately 8-10cm from the medial epicondyle  cleansed with betadine x3. approximately 4cc of 1% licocaine infiltrated along planned insertion path.    Nexplanon applicator placed with bevel of needle down, skin lifted, device inserted to hub   Lever deployed and nexplanon released. Implant palpated by myself and patient in proper location   Small needle puncture hemostatic, steri strip placed, pressure dressing applied  Pt tolerated procedures well.  Complications: none     Lot: [id number]  Exp: 7/1/2023        Ada Gupta MD  [facility], Women's Health

## 2022-02-23 ENCOUNTER — GYNECOLOGY VISIT (OUTPATIENT)
Dept: OBGYN | Facility: CLINIC | Age: 42
End: 2022-02-23
Payer: MEDICAID

## 2022-02-23 ENCOUNTER — HOSPITAL ENCOUNTER (OUTPATIENT)
Facility: MEDICAL CENTER | Age: 42
End: 2022-02-23
Attending: NURSE PRACTITIONER
Payer: MEDICAID

## 2022-02-23 VITALS
SYSTOLIC BLOOD PRESSURE: 120 MMHG | DIASTOLIC BLOOD PRESSURE: 80 MMHG | BODY MASS INDEX: 40.43 KG/M2 | HEIGHT: 64 IN | WEIGHT: 236.8 LBS

## 2022-02-23 DIAGNOSIS — N89.8 ITCHING IN THE VAGINAL AREA: Primary | ICD-10-CM

## 2022-02-23 DIAGNOSIS — Z12.4 CERVICAL CANCER SCREENING: ICD-10-CM

## 2022-02-23 PROCEDURE — 87624 HPV HI-RISK TYP POOLED RSLT: CPT

## 2022-02-23 PROCEDURE — 87510 GARDNER VAG DNA DIR PROBE: CPT

## 2022-02-23 PROCEDURE — 87491 CHLMYD TRACH DNA AMP PROBE: CPT

## 2022-02-23 PROCEDURE — 87480 CANDIDA DNA DIR PROBE: CPT

## 2022-02-23 PROCEDURE — 99213 OFFICE O/P EST LOW 20 MIN: CPT | Performed by: NURSE PRACTITIONER

## 2022-02-23 PROCEDURE — 87591 N.GONORRHOEAE DNA AMP PROB: CPT

## 2022-02-23 PROCEDURE — 88175 CYTOPATH C/V AUTO FLUID REDO: CPT

## 2022-02-23 PROCEDURE — 87660 TRICHOMONAS VAGIN DIR PROBE: CPT

## 2022-02-23 RX ORDER — METRONIDAZOLE 7.5 MG/G
GEL VAGINAL
COMMUNITY

## 2022-02-23 RX ORDER — CLOTRIMAZOLE 1 %
1 CREAM (GRAM) TOPICAL 2 TIMES DAILY
COMMUNITY

## 2022-02-23 RX ORDER — HYDROCORTISONE CREAM 1% 10 MG/G
CREAM TOPICAL
COMMUNITY

## 2022-02-23 NOTE — NON-PROVIDER
Patient here for a GYN follow up.   Last seen on 03/09/2021  C/o  Itching and rash on vagina   pharmacy verified.  Patient phone #: [phone]

## 2022-02-23 NOTE — PROGRESS NOTES
Yousuf Guzman is a 41 y.o. y.o. female who presents for Gynecologic Exam        HPI Comments: Pt reports itching and rash On her labia and internal vagina. She was seen at [facility] for the same about 3-4 weeks ago and was prescribed metronidazole gel for internal use and clotrimazole cream for external use. Pt states these medication did not relieve the itchinf but rather caused burning.  She began using hydrocortizone acetate (Vagisil) cream wihich helped temporarily, and is now using Calmoseptine ointment and vaseline which she says helps.    She is , sexually active with male partner     No LMP recorded.She has a Nexplanon implant in place.    Last pap was in 2018 with her first pregnancy  .  Review of Systems : general appearance: pleasant, healthy  Cardio: NA  Respiratory:NA  : NA  Abdominal:NA  Psychosocial:NA  EENT:NA  Metabolic:NA  Pertinent positives documented in HPI and all other systems reviewed & are negative    All PMH, PSH, allergies, social history and FH reviewed and updated today:  History reviewed. No pertinent past medical history.  History reviewed. No pertinent surgical history.  Patient has no known allergies.  Social History     Socioeconomic History   • Marital status:    Tobacco Use   • Smoking status: Never Smoker   • Smokeless tobacco: Never Used   Vaping Use   • Vaping Use: Never used   Substance and Sexual Activity   • Alcohol use: No   • Drug use: No   • Sexual activity: Yes     Partners: Male     Birth control/protection: None     Comment: None      Family History   Problem Relation Age of Onset   • Glaucoma Mother    • No Known Problems Father    • No Known Problems Brother    • Dementia Maternal Grandmother      Medications:   Current Outpatient Medications Ordered in Epic   Medication Sig Dispense Refill   • metroNIDAZOLE (METROGEL-VAGINAL) 0.75 % Gel Insert  into the vagina at bedtime.     • clotrimazole (LOTRIMIN) 1 % Cream Apply 1 Application topically 2  "times a day.     • Hydrocortisone Acetate (VAGISIL) 1 % Cream Apply  topically.     • menthol-zinc oxide (CALMOSEPTINE) 0.44-20.6 % Ointment ointment Apply  topically as needed.     • docusate sodium 100 MG Cap Take 1 capsule by mouth 2 times a day as needed for Constipation. 60 Cap 0   • ibuprofen (MOTRIN) 600 MG Tab Take 1 Tablet by mouth every 6 hours as needed for cramping after delivery. 30 Tab 0   • Prenatal MV-Min-Fe Fum-FA-DHA (PRENATAL 1 PO) Take  by mouth.       No current Epic-ordered facility-administered medications on file.          Objective:   Vital measurements:  /80 (BP Location: Right arm, Patient Position: Sitting, BP Cuff Size: Adult)   Ht 1.626 m (5' 4\")   Wt 107 kg (236 lb 12.8 oz)   Body mass index is 40.65 kg/m². (Goal BM I>18 <25)    Physical Exam   Nursing note and vitals reviewed.  Constitutional: She is oriented to person, place, and time. She appears well-developed and well-nourished. No distress.     HEENT:   Head: Normocephalic and atraumatic.   Right Ear: External ear normal.   Left Ear: External ear normal.   Nose: Nose normal.   Eyes: Conjunctivae and EOM are normal. Pupils are equal, round, and reactive to light. No scleral icterus.     Neck: Normal range of motion. Neck supple. No tracheal deviation present. No thyromegaly present.     Pulmonary/Chest: Effort normal and breath sounds normal. No respiratory distress. She has no wheezes. She has no rales. She exhibits no tenderness.     Cardiovascular: Regular, rate and rhythm. No JVD.    Abdominal: Soft. Bowel sounds are normal. She exhibits no distension and no mass. No tenderness. She has no rebound and no guarding.     Breast:  deferred    Genitourinary:  Pelvic exam was performed with patient supine.  External genitalia with no abnormal pigmentation, labial fusion,rash, tenderness, lesion or injury to the labia bilaterally.  Vagina is moist with no lesions, foul discharge, erythema, tenderness or bleeding. No foreign " body around the vagina or signs of injury.   Cervix exhibits no motion tenderness, no discharge and no friability.   Uterus is midline not deviated, not enlarged, not fixed and not tender.  Right adnexum displays no mass, no tenderness and no fullness. Left adnexum displays no mass, no tenderness and no fullness.     Musculoskeletal: Normal range of motion. She exhibits no edema and no tenderness.     Lymphadenopathy: She has no cervical adenopathy.     Neurological: She is alert and oriented to person, place, and time. She exhibits normal muscle tone.     Skin: Skin is warm and dry. No rash noted. She is not diaphoretic. No erythema. No pallor.     Psychiatric: She has a normal mood and affect. Her behavior is normal. Judgment and thought content normal.               Assessment:     1. Itching in the vaginal area  VAGINAL PATHOGENS DNA PANEL   2. Cervical cancer screening  THINPREP PAP W/HPV AND CTNG         Plan:   Pap and physical exam performed  Vaginal pathogen sent  Pt understands we will call with any abnormal lab results, in the meantime, she can continue to use the vaseline and ointment that works best for her.

## 2022-02-24 DIAGNOSIS — N89.8 ITCHING IN THE VAGINAL AREA: ICD-10-CM

## 2022-02-24 DIAGNOSIS — Z12.4 CERVICAL CANCER SCREENING: ICD-10-CM

## 2022-02-24 LAB
CANDIDA DNA VAG QL PROBE+SIG AMP: NEGATIVE
G VAGINALIS DNA VAG QL PROBE+SIG AMP: POSITIVE
T VAGINALIS DNA VAG QL PROBE+SIG AMP: NEGATIVE

## 2022-02-24 RX ORDER — METRONIDAZOLE 500 MG/1
500 TABLET ORAL 2 TIMES DAILY
Qty: 14 TABLET | Refills: 0 | Status: SHIPPED | OUTPATIENT
Start: 2022-02-24 | End: 2022-03-03

## 2024-10-28 ENCOUNTER — APPOINTMENT (OUTPATIENT)
Dept: OBGYN | Facility: CLINIC | Age: 44
End: 2024-10-28
Payer: COMMERCIAL

## 2024-11-09 NOTE — PROGRESS NOTES
[facility] WOMEN'S HEALTH  GYN FOLLOW UP VISIT    CC:  Contraception      Subjective   HPI: Patient is a 44 y.o.  who presents for Nexplanon removal and replacement. It was initially placed 3/9/21. Last pap NILM, HPV neg 2022. Not having regular menses on Nexplanon.  undergoing vasectomy in one month. She may desire removal after his procedure.    Review of Systems   All other systems reviewed and are negative.      History reviewed. No pertinent past medical history.  History reviewed. No pertinent surgical history.  Current Outpatient Medications   Medication Instructions    clotrimazole (LOTRIMIN) 1 % Cream 1 Application , Topical, 2 TIMES DAILY    docusate sodium 100 MG Cap Take 1 capsule by mouth 2 times a day as needed for Constipation.    Hydrocortisone Acetate (VAGISIL) 1 % Cream Apply externally    ibuprofen (MOTRIN) 600 MG Tab Take 1 Tablet by mouth every 6 hours as needed for cramping after delivery.    menthol-zinc oxide (CALMOSEPTINE) 0.44-20.6 % Ointment ointment Topical, PRN    metroNIDAZOLE (METROGEL-VAGINAL) 0.75 % Gel Vaginal, EVERY BEDTIME    Prenatal MV-Min-Fe Fum-FA-DHA (PRENATAL 1 PO) Oral     Patient has no known allergies.    Objective   Vital Signs: There were no vitals filed for this visit.  There is no height or weight on file to calculate BMI.    PHYSICAL EXAM:  Gen: appears well, NAD  Respiratory: normal effort  Abdomen: Soft, non-tender.    Assessment & Plan   44 y.o.  presents for Nexplanon removal and replacement.    - Pt satisfied with Nexplanon for contraception and bleeding profile. Desires removal and replacement today. Please see procedure note for full counseling.  - Pt's  undergoing vasectomy in approx 1 month. Recommend leaving Nexplanon in place until after his semen analysis to prove vasectomy effective.     Lenka Monteiro M.D.

## 2024-11-09 NOTE — PROGRESS NOTES
PROCEDURE: NEXPLANON REMOVAL & RESINSERTION    Indication & Consent: Today the patient presents for Nexplanon removal and replacement. The Nexplanon has been in place since March 2021. She was counseled on the risks of Nexplanon removal and replacement. Specifically discussed were alternative forms of birth control. Discussed that the Nexplanon has been approved for contraceptive use for three years, however more recent studies have demonstrated effectiveness for up to four years. I also discussed with the patient the risk of infection on removal, pain during procedure, bleeding, bruising, and irregular uterine bleeding. The procedure discussed at length with patient, including use of local anesthetic. The patient was given the opportunity to ask questions regarding the procedure, risks and benefits, all questions are answered in their entirety. Informed consent is signed.    Procedure:  Prior to the procedure, a urine pregnancy test was performed and negative. The patient was positioned on the examination table with her left arm flexed at the elbow and externally rotated so that her wrist was parallel to her ear.  The Nexplanon was easily palpated along the medial aspect of the left upper arm and felt to be superficial. The skin over the distal end of the implant was cleansed with betadine and 1% lidocaine with epinephrine was injected subcutaneously. A small incision was made and the implant was removed with forceps. Attention was then turned to resinsertion. The Nexplanon device was removed from the package and the protective covering was removed from the device.  The white colored implant was verified in the device.  The device was then inserted into the incision at 30 degrees, the applicator was then lowered to the horizontal position and the needle was then inserted to its full length. The slider was then pulled back fully and the implant was released. Presence of the implant was verified by both the physician  and the patient.  Minimal blood loss. A bandage was placed over the insertion site and kerlex wrap was applied. Patient tolerated the procedure well.     Nexplanon implant information was collected and is to be scanned into the patient's electronic medical record. She was counseled on need for back-up contraception for seven days.     Lenka Monteiro M.D.

## 2024-11-11 ENCOUNTER — GYNECOLOGY VISIT (OUTPATIENT)
Dept: OBGYN | Facility: CLINIC | Age: 44
End: 2024-11-11
Payer: COMMERCIAL

## 2024-11-11 DIAGNOSIS — Z30.46 ENCOUNTER FOR REMOVAL AND REINSERTION OF NEXPLANON: ICD-10-CM

## 2024-11-11 PROCEDURE — 11983 REMOVE/INSERT DRUG IMPLANT: CPT | Performed by: STUDENT IN AN ORGANIZED HEALTH CARE EDUCATION/TRAINING PROGRAM

## 2024-11-27 ENCOUNTER — APPOINTMENT (OUTPATIENT)
Dept: MEDICAL GROUP | Facility: PHYSICIAN GROUP | Age: 44
End: 2024-11-27
Payer: COMMERCIAL

## 2025-01-14 ENCOUNTER — APPOINTMENT (OUTPATIENT)
Dept: MEDICAL GROUP | Facility: PHYSICIAN GROUP | Age: 45
End: 2025-01-14
Payer: COMMERCIAL

## 2025-01-21 ENCOUNTER — OFFICE VISIT (OUTPATIENT)
Dept: MEDICAL GROUP | Facility: PHYSICIAN GROUP | Age: 45
End: 2025-01-21
Payer: COMMERCIAL

## 2025-01-21 ENCOUNTER — HOSPITAL ENCOUNTER (OUTPATIENT)
Facility: MEDICAL CENTER | Age: 45
End: 2025-01-21
Attending: NURSE PRACTITIONER
Payer: COMMERCIAL

## 2025-01-21 VITALS
BODY MASS INDEX: 43.36 KG/M2 | TEMPERATURE: 99.3 F | DIASTOLIC BLOOD PRESSURE: 80 MMHG | HEART RATE: 85 BPM | SYSTOLIC BLOOD PRESSURE: 110 MMHG | WEIGHT: 254 LBS | HEIGHT: 64 IN | OXYGEN SATURATION: 98 %

## 2025-01-21 DIAGNOSIS — Z13.228 SCREENING FOR ENDOCRINE, METABOLIC AND IMMUNITY DISORDER: ICD-10-CM

## 2025-01-21 DIAGNOSIS — Z97.5 NEXPLANON IN PLACE: ICD-10-CM

## 2025-01-21 DIAGNOSIS — N89.8 ITCHING IN THE VAGINAL AREA: ICD-10-CM

## 2025-01-21 DIAGNOSIS — Z13.29 SCREENING FOR ENDOCRINE, METABOLIC AND IMMUNITY DISORDER: ICD-10-CM

## 2025-01-21 DIAGNOSIS — L70.0 ACNE VULGARIS: ICD-10-CM

## 2025-01-21 DIAGNOSIS — Z00.00 HEALTHCARE MAINTENANCE: ICD-10-CM

## 2025-01-21 DIAGNOSIS — E66.01 MORBID OBESITY WITH BMI OF 40.0-44.9, ADULT (HCC): ICD-10-CM

## 2025-01-21 DIAGNOSIS — Z23 NEED FOR VACCINATION: ICD-10-CM

## 2025-01-21 DIAGNOSIS — Z76.89 ENCOUNTER TO ESTABLISH CARE: ICD-10-CM

## 2025-01-21 DIAGNOSIS — Z00.00 PREVENTATIVE HEALTH CARE: ICD-10-CM

## 2025-01-21 DIAGNOSIS — Z13.0 SCREENING FOR ENDOCRINE, METABOLIC AND IMMUNITY DISORDER: ICD-10-CM

## 2025-01-21 PROBLEM — Z30.2 ENCOUNTER FOR STERILIZATION: Status: RESOLVED | Noted: 2021-03-05 | Resolved: 2025-01-21

## 2025-01-21 PROCEDURE — 87660 TRICHOMONAS VAGIN DIR PROBE: CPT

## 2025-01-21 PROCEDURE — 90746 HEPB VACCINE 3 DOSE ADULT IM: CPT | Performed by: NURSE PRACTITIONER

## 2025-01-21 PROCEDURE — 99204 OFFICE O/P NEW MOD 45 MIN: CPT | Mod: 25 | Performed by: NURSE PRACTITIONER

## 2025-01-21 PROCEDURE — 3074F SYST BP LT 130 MM HG: CPT | Performed by: NURSE PRACTITIONER

## 2025-01-21 PROCEDURE — 87480 CANDIDA DNA DIR PROBE: CPT

## 2025-01-21 PROCEDURE — 90471 IMMUNIZATION ADMIN: CPT | Performed by: NURSE PRACTITIONER

## 2025-01-21 PROCEDURE — 3079F DIAST BP 80-89 MM HG: CPT | Performed by: NURSE PRACTITIONER

## 2025-01-21 PROCEDURE — 87510 GARDNER VAG DNA DIR PROBE: CPT

## 2025-01-21 RX ORDER — TRETINOIN 0.25 MG/G
1 GEL TOPICAL NIGHTLY
Qty: 45 G | Refills: 1 | Status: SHIPPED | OUTPATIENT
Start: 2025-01-21

## 2025-01-21 ASSESSMENT — PATIENT HEALTH QUESTIONNAIRE - PHQ9: CLINICAL INTERPRETATION OF PHQ2 SCORE: 0

## 2025-01-21 NOTE — LETTER
[facility]  CHIOMA Kirk  [address] [place] [address]  Fax: [phone]   Authorization for Release/Disclosure of   Protected Health Information   Name: ARTEMIO HONG : 1980 SSN: [id number]   Address: [address] [place] [id number] Phone:    There are no phone numbers on file.   I authorize the entity listed below to release/disclose the PHI below to:   [facility]OMA Kirk and [facility]OMA Kirk   Provider or Entity Name:  [facility] [place] [facility]   Address   City, [facility], Zip  [address] [place], [place] [address] Phone:      Fax:     Reason for request: continuity of care   Information to be released:    [  ] LAST COLONOSCOPY,  including any PATH REPORT and follow-up  [  ] LAST FIT/COLOGUARD RESULT [  ] LAST DEXA  [  ] LAST MAMMOGRAM  [  ] LAST PAP  [  ] LAST LABS [  ] RETINA EXAM REPORT  [  ] IMMUNIZATION RECORDS  [xx] Release all info      [  ] Check here and initial the line next to each item to release ALL health information INCLUDING  _____ Care and treatment for drug and / or alcohol abuse  _____ HIV testing, infection status, or AIDS  _____ Genetic Testing    DATES OF SERVICE OR TIME PERIOD TO BE DISCLOSED: _____________  I understand and acknowledge that:  * This Authorization may be revoked at any time by you in writing, except if your health information has already been used or disclosed.  * Your health information that will be used or disclosed as a result of you signing this authorization could be re-disclosed by the recipient. If this occurs, your re-disclosed health information may no longer be protected by State or Federal laws.  * You may refuse to sign this Authorization. Your refusal will not affect your ability to obtain treatment.  * This Authorization becomes effective upon signing and will  on (date) __________.      If no date is indicated, this Authorization will  one (1) year from the signature  date.    Name: Yousuf Kyra Guzman  Signature: Date:   1/21/2025     PLEASE FAX REQUESTED RECORDS BACK TO: [phone]

## 2025-01-21 NOTE — ASSESSMENT & PLAN NOTE
History of acne. She has used OTC. Recently using [employer] skin health. She is asking for medication for breakouts and scarring.  Her PCP from 10 years ago had prescribed Retin-A.  She would typically use twice weekly due to skin dryness.

## 2025-01-21 NOTE — ASSESSMENT & PLAN NOTE
Chronic problem x 3 years after last vaginal delivery. She does have gynecologist. Has Nexplanon. The itching is mainly on labia. The itching is intermittent. She can have no itching for months. When she wears dresses with body suit it can exacerbate. No new soaps, lotions, detergents. Now having itching to lower legs. She has been treated for BV with metronidazole intravaginally that caused burning. She has use OTC creams that burned. She was using menopause vaginal OTC cream that helped. She is no longer waxing or shaving. She has not seen dermatology. Also notes that with intercourse with , semen can cause itching but not recently. Check vaginal pathogens swab. No urinary symptoms.

## 2025-01-21 NOTE — ASSESSMENT & PLAN NOTE
BMI today 43.60 kg/m².  She is interested in labs.  Discussed referral to dietitian and she declines at this time.  She is not currently exercising but plans to start.

## 2025-01-21 NOTE — PROGRESS NOTES
Subjective:     CC:  Diagnoses of Encounter to establish care, Nexplanon in place, Healthcare maintenance, Itching in the vaginal area, Morbid obesity with BMI of 40.0-44.9, adult (HCC), Need for vaccination, Preventative health care, Screening for endocrine, metabolic and immunity disorder, and Acne vulgaris were pertinent to this visit.    HISTORY OF THE PRESENT ILLNESS: Patient is a 44 y.o. female. This pleasant patient is here today to establish care and discuss the following. Her prior PCP was Dr. Tim Ryan.      Itching in the vaginal area  Chronic problem x 3 years after last vaginal delivery. She does have gynecologist. Has Nexplanon. The itching is mainly on labia. The itching is intermittent. She can have no itching for months. When she wears dresses with body suit it can exacerbate. No new soaps, lotions, detergents. Now having itching to lower legs. She has been treated for BV with metronidazole intravaginally that caused burning. She has use OTC creams that burned. She was using menopause vaginal OTC cream that helped. She is no longer waxing or shaving. She has not seen dermatology. Also notes that with intercourse with , semen can cause itching but not recently. Check vaginal pathogens swab. No urinary symptoms.    Nexplanon in place  Removed and replaced 11/2024 with gynecology.    Healthcare maintenance  She is interested in labs.  No history of gestational diabetes. She is taking magnesium for relaxation and constipation.  We are updating her hepatitis B vaccine today.    Acne vulgaris  History of acne. She has used OTC. Recently using [facility] skin [facility]. She is asking for medication for breakouts and scarring.  Her PCP from 10 years ago had prescribed Retin-A.  She would typically use twice weekly due to skin dryness.    Morbid obesity with BMI of 40.0-44.9, adult (HCC)  BMI today 43.60 kg/m².  She is interested in labs.  Discussed referral to dietitian and she declines at this time.  She is  "not currently exercising but plans to start.      Allergies: Patient has no known allergies.    Current Outpatient Medications Ordered in Epic   Medication Sig Dispense Refill    magnesium citrate Solution Take 300 mL by mouth one time.      Magnesium Gluconate (MAGNESIUM 27 PO) Take  by mouth.      Probiotic Product (PROBIOTIC BLEND PO) Take  by mouth.      tretinoin (RETIN-A) 0.025 % gel Apply 1 Application topically every evening. 45 g 1     No current [facility]-ordered facility-administered medications on file.       History reviewed. No pertinent past medical history.    History reviewed. No pertinent surgical history.    Social History     Tobacco Use    Smoking status: Never    Smokeless tobacco: Never   Vaping Use    Vaping status: Never Used   Substance Use Topics    Alcohol use: No    Drug use: No       Social History     Social History Narrative    Not on file       Family History   Problem Relation Age of Onset    Glaucoma Mother     No Known Problems Father     No Known Problems Sister     No Known Problems Sister     No Known Problems Sister     No Known Problems Sister     No Known Problems Sister     No Known Problems Brother     No Known Problems Brother     Dementia Maternal Grandmother     Cancer Maternal Grandfather     Hypertension Paternal Grandmother     Hyperlipidemia Paternal Grandmother     Heart Disease Paternal Grandmother     No Known Problems Paternal Grandfather     No Known Problems Daughter     No Known Problems Daughter     No Known Problems Son        Health Maintenance: reviewed. Orders placed.         Objective:     Vital signs reviewed   Exam: /80 (BP Location: Left arm, Patient Position: Sitting, BP Cuff Size: Adult)   Pulse 85   Temp 37.4 °C (99.3 °F) (Temporal)   Ht 1.626 m (5' 4\")   Wt 115 kg (254 lb)   SpO2 98%  Body mass index is 43.6 kg/m².    Gen: Alert and oriented, No apparent distress.  Neck: Neck is supple without lymphadenopathy.  Lungs: Normal effort, CTA " bilaterally, no wheezes, rhonchi, or rales.    CV: Regular rate and rhythm. No murmurs, rubs, or gallops.  Skin:    acne scarring to chin area.       Assessment & Plan:   44 y.o. female with the following -    1. Encounter to establish care  Acute uncomplicated problem.  Care established.    2. Itching in the vaginal area  Chronic exacerbated problem.  Patient self collected vaginal pathogen swab today.  Await results.  Continue with sensitive free detergents, soaps and lotions.  Referral to dermatology.  She is comfortable with her MyChart and we will follow-up with her lab results on MyChart.  - VAGINAL PATHOGENS DNA PANEL; Future  - Referral to Dermatology    3. Acne vulgaris  Chronic exacerbated problem.  Start Tretinoin 0.025% twice weekly.   - tretinoin (RETIN-A) 0.025 % gel; Apply 1 Application topically every evening.  Dispense: 45 g; Refill: 1    4. Morbid obesity with BMI of 40.0-44.9, adult (HCC)  Chronic exacerbated problem.  Declines referral to dietitian today.  Encouraged healthy diet and increasing exercise.  Check updated labs.  - Patient identified as having weight management issue.  Appropriate orders and counseling given.  - CBC WITH DIFFERENTIAL; Future  - Comp Metabolic Panel; Future  - HEMOGLOBIN A1C; Future  - Lipid Profile; Future  - TSH WITH REFLEX TO FT4; Future    5. Nexplanon in place  Chronic stable problem.  Continue follow-up with gynecology.    6. Healthcare maintenance  Acute uncomplicated problem.  Labs have been ordered.  Completing hep B vaccine today.    7. Preventative health care  Acute uncomplicated problem.  Screening labs ordered.  - CBC WITH DIFFERENTIAL; Future  - Comp Metabolic Panel; Future  - Lipid Profile; Future    8. Screening for endocrine, metabolic and immunity disorder  Acute uncomplicated problem.  Screening labs ordered.  - HEMOGLOBIN A1C; Future  - TSH WITH REFLEX TO FT4; Future  - VITAMIN D,25 HYDROXY (DEFICIENCY); Future    9. Need for vaccination  Acute  uncomplicated problem.  She would like her hepatitis B vaccine today. I have placed the below orders and discussed them with an approved delegating provider. The MA is performing the below orders under the direction of Dr. Langley.  - Hepatitis B Vaccine Adult 20+      Return in about 4 months (around 5/21/2025) for weight .    Please note that this dictation was created using voice recognition software. I have made every reasonable attempt to correct obvious errors, but I expect that there are errors of grammar and possibly content that I did not discover before finalizing the note.

## 2025-01-21 NOTE — ASSESSMENT & PLAN NOTE
She is interested in labs.  No history of gestational diabetes. She is taking magnesium for relaxation and constipation.  We are updating her hepatitis B vaccine today.

## 2025-01-22 DIAGNOSIS — N76.0 BACTERIAL VAGINOSIS: ICD-10-CM

## 2025-01-22 DIAGNOSIS — B96.89 BACTERIAL VAGINOSIS: ICD-10-CM

## 2025-01-22 RX ORDER — METRONIDAZOLE 500 MG/1
500 TABLET ORAL 2 TIMES DAILY
Qty: 14 TABLET | Refills: 0 | Status: SHIPPED | OUTPATIENT
Start: 2025-01-22 | End: 2025-01-29

## 2025-05-08 ENCOUNTER — HOSPITAL ENCOUNTER (OUTPATIENT)
Dept: LAB | Facility: MEDICAL CENTER | Age: 45
End: 2025-05-08
Attending: NURSE PRACTITIONER
Payer: COMMERCIAL

## 2025-05-08 DIAGNOSIS — Z13.228 SCREENING FOR ENDOCRINE, METABOLIC AND IMMUNITY DISORDER: ICD-10-CM

## 2025-05-08 DIAGNOSIS — Z00.00 PREVENTATIVE HEALTH CARE: ICD-10-CM

## 2025-05-08 DIAGNOSIS — E66.01 MORBID OBESITY WITH BMI OF 40.0-44.9, ADULT (HCC): ICD-10-CM

## 2025-05-08 DIAGNOSIS — Z13.0 SCREENING FOR ENDOCRINE, METABOLIC AND IMMUNITY DISORDER: ICD-10-CM

## 2025-05-08 DIAGNOSIS — Z13.29 SCREENING FOR ENDOCRINE, METABOLIC AND IMMUNITY DISORDER: ICD-10-CM

## 2025-05-08 LAB
25(OH)D3 SERPL-MCNC: 26 NG/ML (ref 30–100)
ALBUMIN SERPL BCP-MCNC: 4.1 G/DL (ref 3.2–4.9)
ALBUMIN/GLOB SERPL: 1.4 G/DL
ALP SERPL-CCNC: 83 U/L (ref 30–99)
ALT SERPL-CCNC: 14 U/L (ref 2–50)
ANION GAP SERPL CALC-SCNC: 9 MMOL/L (ref 7–16)
AST SERPL-CCNC: 17 U/L (ref 12–45)
BASOPHILS # BLD AUTO: 0.3 % (ref 0–1.8)
BASOPHILS # BLD: 0.02 K/UL (ref 0–0.12)
BILIRUB SERPL-MCNC: 0.4 MG/DL (ref 0.1–1.5)
BUN SERPL-MCNC: 11 MG/DL (ref 8–22)
CALCIUM ALBUM COR SERPL-MCNC: 8.6 MG/DL (ref 8.5–10.5)
CALCIUM SERPL-MCNC: 8.7 MG/DL (ref 8.5–10.5)
CHLORIDE SERPL-SCNC: 104 MMOL/L (ref 96–112)
CHOLEST SERPL-MCNC: 207 MG/DL (ref 100–199)
CO2 SERPL-SCNC: 24 MMOL/L (ref 20–33)
CREAT SERPL-MCNC: 0.71 MG/DL (ref 0.5–1.4)
EOSINOPHIL # BLD AUTO: 0.16 K/UL (ref 0–0.51)
EOSINOPHIL NFR BLD: 2.6 % (ref 0–6.9)
ERYTHROCYTE [DISTWIDTH] IN BLOOD BY AUTOMATED COUNT: 45.1 FL (ref 35.9–50)
EST. AVERAGE GLUCOSE BLD GHB EST-MCNC: 117 MG/DL
FASTING STATUS PATIENT QL REPORTED: NORMAL
GFR SERPLBLD CREATININE-BSD FMLA CKD-EPI: 108 ML/MIN/1.73 M 2
GLOBULIN SER CALC-MCNC: 3 G/DL (ref 1.9–3.5)
GLUCOSE SERPL-MCNC: 88 MG/DL (ref 65–99)
HBA1C MFR BLD: 5.7 % (ref 4–5.6)
HCT VFR BLD AUTO: 40.9 % (ref 37–47)
HDLC SERPL-MCNC: 47 MG/DL
HGB BLD-MCNC: 13.6 G/DL (ref 12–16)
IMM GRANULOCYTES # BLD AUTO: 0.01 K/UL (ref 0–0.11)
IMM GRANULOCYTES NFR BLD AUTO: 0.2 % (ref 0–0.9)
LDLC SERPL CALC-MCNC: 144 MG/DL
LYMPHOCYTES # BLD AUTO: 1.16 K/UL (ref 1–4.8)
LYMPHOCYTES NFR BLD: 19 % (ref 22–41)
MCH RBC QN AUTO: 32.8 PG (ref 27–33)
MCHC RBC AUTO-ENTMCNC: 33.3 G/DL (ref 32.2–35.5)
MCV RBC AUTO: 98.6 FL (ref 81.4–97.8)
MONOCYTES # BLD AUTO: 0.47 K/UL (ref 0–0.85)
MONOCYTES NFR BLD AUTO: 7.7 % (ref 0–13.4)
NEUTROPHILS # BLD AUTO: 4.3 K/UL (ref 1.82–7.42)
NEUTROPHILS NFR BLD: 70.2 % (ref 44–72)
NRBC # BLD AUTO: 0 K/UL
NRBC BLD-RTO: 0 /100 WBC (ref 0–0.2)
PLATELET # BLD AUTO: 255 K/UL (ref 164–446)
PMV BLD AUTO: 10.8 FL (ref 9–12.9)
POTASSIUM SERPL-SCNC: 4.1 MMOL/L (ref 3.6–5.5)
PROT SERPL-MCNC: 7.1 G/DL (ref 6–8.2)
RBC # BLD AUTO: 4.15 M/UL (ref 4.2–5.4)
SODIUM SERPL-SCNC: 137 MMOL/L (ref 135–145)
TRIGL SERPL-MCNC: 82 MG/DL (ref 0–149)
TSH SERPL DL<=0.005 MIU/L-ACNC: 1.28 UIU/ML (ref 0.38–5.33)
WBC # BLD AUTO: 6.1 K/UL (ref 4.8–10.8)

## 2025-05-08 PROCEDURE — 85025 COMPLETE CBC W/AUTO DIFF WBC: CPT

## 2025-05-08 PROCEDURE — 80061 LIPID PANEL: CPT

## 2025-05-08 PROCEDURE — 80053 COMPREHEN METABOLIC PANEL: CPT

## 2025-05-08 PROCEDURE — 36415 COLL VENOUS BLD VENIPUNCTURE: CPT

## 2025-05-08 PROCEDURE — 82306 VITAMIN D 25 HYDROXY: CPT

## 2025-05-08 PROCEDURE — 83036 HEMOGLOBIN GLYCOSYLATED A1C: CPT

## 2025-05-08 PROCEDURE — 84443 ASSAY THYROID STIM HORMONE: CPT

## 2025-05-12 ENCOUNTER — RESULTS FOLLOW-UP (OUTPATIENT)
Dept: MEDICAL GROUP | Facility: PHYSICIAN GROUP | Age: 45
End: 2025-05-12

## 2025-05-29 ENCOUNTER — APPOINTMENT (OUTPATIENT)
Dept: MEDICAL GROUP | Facility: PHYSICIAN GROUP | Age: 45
End: 2025-05-29
Payer: COMMERCIAL

## 2025-05-29 VITALS
SYSTOLIC BLOOD PRESSURE: 98 MMHG | HEART RATE: 75 BPM | DIASTOLIC BLOOD PRESSURE: 70 MMHG | WEIGHT: 255.95 LBS | HEIGHT: 64 IN | OXYGEN SATURATION: 97 % | BODY MASS INDEX: 43.7 KG/M2 | TEMPERATURE: 98.9 F

## 2025-05-29 DIAGNOSIS — R73.03 PREDIABETES: ICD-10-CM

## 2025-05-29 DIAGNOSIS — E78.5 DYSLIPIDEMIA: Primary | ICD-10-CM

## 2025-05-29 DIAGNOSIS — Z71.2 ENCOUNTER TO DISCUSS TEST RESULTS: ICD-10-CM

## 2025-05-29 DIAGNOSIS — Z23 NEED FOR VACCINATION: ICD-10-CM

## 2025-05-29 DIAGNOSIS — E55.9 VITAMIN D INSUFFICIENCY: ICD-10-CM

## 2025-05-29 ASSESSMENT — FIBROSIS 4 INDEX: FIB4 SCORE: 0.77

## 2025-05-29 NOTE — PROGRESS NOTES
"Subjective:     CC: Lab results    HPI:   Yousuf presents today with the following:    Dyslipidemia  Recent labs showed elevated total cholesterol and LDL.  Encouraged healthy diet and exercise.  Plan for repeat labs around November 2025.  The 10-year ASCVD risk score (Shelbi CANELA, et al., 2019) is: 0.6%      Prediabetes  Recent labs show fasting glucose of 88 with A1c of 5.7%.  Discussed watching carbohydrate and sugar intake.  Continue exercising.  A1c around November 2025.    Vitamin D insufficiency  No current vitamin D supplementation.  Recent labs showed vitamin D level of 26.  Recommend vitamin D3 2000 units daily.  Reassess labs around November 2025.      Past Medical History[1]    Social History[2]    Current Medications and Prescriptions Ordered in Epic[3]    Allergies:  Patient has no known allergies.    Health Maintenance: Completing hep B vaccine today.  Encouraged to schedule with gynecology for Pap smear.      Objective:     Vital signs reviewed  Exam:  BP 98/70 (BP Location: Right arm, Patient Position: Sitting, BP Cuff Size: Adult)   Pulse 75   Temp 37.2 °C (98.9 °F) (Temporal)   Ht 1.626 m (5' 4\")   Wt 116 kg (255 lb 15.3 oz)   SpO2 97%   BMI 43.93 kg/m²  Body mass index is 43.93 kg/m².    Gen: Alert and oriented, No apparent distress.  Lungs: Normal effort, CTA bilaterally, no wheezes, rhonchi, or rales  CV: Regular rate and rhythm. No murmurs, rubs, or gallops.    Assessment & Plan:     44 y.o. female with the following -     1. Dyslipidemia (Primary)  Acute uncomplicated problem.  New problem to examiner.  She will work on diet and exercise.  Repeat labs around November 2025.  - Lipid Profile; Future    2. Prediabetes  Acute uncomplicated problem.  New problem to examiner. She will work on diet and exercise.  A1c around November 2025.  - HEMOGLOBIN A1C; Future    3. Vitamin D insufficiency  Acute uncomplicated problem.  New problem to examiner.  Start over-the-counter vitamin D3 2000 units " daily.  Repeat labs around November 2025.  - VITAMIN D,25 HYDROXY (DEFICIENCY); Future    4. Need for vaccination  Acute uncomplicated problem.  She would like her hepatitis B vaccine today. I have placed the below orders and discussed them with an approved delegating provider. The MA is performing the below orders under the direction of Dr. Guillory.  - Hepatitis B Vaccine Adult 20+    5. Encounter to discuss test results  Acute uncomplicated problem.  Discussed and reviewed lab results from 5/8/2025.      Return in about 6 months (around 11/29/2025) for Labs.    Please note that this dictation was created using voice recognition software. I have made every reasonable attempt to correct obvious errors, but I expect that there are errors of grammar and possibly content that I did not discover before finalizing the note.             [1]   Past Medical History:  Diagnosis Date    Dyslipidemia 5/29/2025   [2]   Social History  Tobacco Use    Smoking status: Never    Smokeless tobacco: Never   Vaping Use    Vaping status: Never Used   Substance Use Topics    Alcohol use: No    Drug use: No   [3]   Current Outpatient Medications Ordered in Epic   Medication Sig Dispense Refill    magnesium citrate Solution Take 300 mL by mouth one time.      Magnesium Gluconate (MAGNESIUM 27 PO) Take  by mouth.      Probiotic Product (PROBIOTIC BLEND PO) Take  by mouth. (Patient not taking: Reported on 5/29/2025)      tretinoin (RETIN-A) 0.025 % gel Apply 1 Application topically every evening. (Patient not taking: Reported on 5/29/2025) 45 g 1     No current [facility]-ordered facility-administered medications on file.

## 2025-05-30 NOTE — ASSESSMENT & PLAN NOTE
Recent labs show fasting glucose of 88 with A1c of 5.7%.  Discussed watching carbohydrate and sugar intake.  Continue exercising.  A1c around November 2025.

## 2025-05-30 NOTE — ASSESSMENT & PLAN NOTE
Recent labs showed elevated total cholesterol and LDL.  Encouraged healthy diet and exercise.  Plan for repeat labs around November 2025.  The 10-year ASCVD risk score (Shelbi CANELA, et al., 2019) is: 0.6%

## 2025-05-30 NOTE — ASSESSMENT & PLAN NOTE
No current vitamin D supplementation.  Recent labs showed vitamin D level of 26.  Recommend vitamin D3 2000 units daily.  Reassess labs around November 2025.